# Patient Record
Sex: FEMALE | Race: WHITE | Employment: OTHER | URBAN - METROPOLITAN AREA
[De-identification: names, ages, dates, MRNs, and addresses within clinical notes are randomized per-mention and may not be internally consistent; named-entity substitution may affect disease eponyms.]

---

## 2017-01-10 ENCOUNTER — APPOINTMENT (OUTPATIENT)
Dept: LAB | Age: 82
End: 2017-01-10
Attending: INTERNAL MEDICINE
Payer: MEDICARE

## 2017-01-10 DIAGNOSIS — I10 ESSENTIAL HYPERTENSION, BENIGN: ICD-10-CM

## 2017-01-10 DIAGNOSIS — E78.5 HYPERLIPIDEMIA, UNSPECIFIED HYPERLIPIDEMIA TYPE: ICD-10-CM

## 2017-01-10 LAB
ALBUMIN SERPL BCP-MCNC: 3.5 G/DL (ref 3.5–4.8)
ALBUMIN/GLOB SERPL: 1.3 {RATIO} (ref 1–2)
ALP SERPL-CCNC: 68 U/L (ref 32–100)
ALT SERPL-CCNC: 25 U/L (ref 14–54)
ANION GAP SERPL CALC-SCNC: 10 MMOL/L (ref 0–18)
AST SERPL-CCNC: 28 U/L (ref 15–41)
BILIRUB SERPL-MCNC: 0.9 MG/DL (ref 0.3–1.2)
BUN SERPL-MCNC: 23 MG/DL (ref 8–20)
BUN/CREAT SERPL: 21.5 (ref 10–20)
CALCIUM SERPL-MCNC: 9.2 MG/DL (ref 8.5–10.5)
CHLORIDE SERPL-SCNC: 106 MMOL/L (ref 95–110)
CHOLEST SERPL-MCNC: 152 MG/DL (ref 110–200)
CO2 SERPL-SCNC: 23 MMOL/L (ref 22–32)
CREAT SERPL-MCNC: 1.07 MG/DL (ref 0.5–1.5)
GLOBULIN PLAS-MCNC: 2.7 G/DL (ref 2.5–3.7)
GLUCOSE SERPL-MCNC: 118 MG/DL (ref 70–99)
HDLC SERPL-MCNC: 57 MG/DL
LDLC SERPL CALC-MCNC: 77 MG/DL (ref 0–99)
NONHDLC SERPL-MCNC: 95 MG/DL
OSMOLALITY UR CALC.SUM OF ELEC: 293 MOSM/KG (ref 275–295)
POTASSIUM SERPL-SCNC: 4.1 MMOL/L (ref 3.3–5.1)
PROT SERPL-MCNC: 6.2 G/DL (ref 5.9–8.4)
SODIUM SERPL-SCNC: 139 MMOL/L (ref 136–144)
TRIGL SERPL-MCNC: 90 MG/DL (ref 1–149)

## 2017-01-10 PROCEDURE — 80061 LIPID PANEL: CPT

## 2017-01-10 PROCEDURE — 36415 COLL VENOUS BLD VENIPUNCTURE: CPT

## 2017-01-10 PROCEDURE — 80053 COMPREHEN METABOLIC PANEL: CPT

## 2017-01-31 RX ORDER — CLONIDINE HYDROCHLORIDE 0.1 MG/1
TABLET ORAL
Qty: 180 TABLET | Refills: 2 | Status: SHIPPED | OUTPATIENT
Start: 2017-01-31 | End: 2017-10-23

## 2017-01-31 RX ORDER — CARVEDILOL 25 MG/1
TABLET ORAL
Qty: 180 TABLET | Refills: 2 | Status: SHIPPED | OUTPATIENT
Start: 2017-01-31 | End: 2017-10-23

## 2017-01-31 RX ORDER — LOSARTAN POTASSIUM 100 MG/1
TABLET ORAL
Qty: 90 TABLET | Refills: 2 | Status: SHIPPED | OUTPATIENT
Start: 2017-01-31 | End: 2017-10-23

## 2017-05-12 ENCOUNTER — TELEPHONE (OUTPATIENT)
Dept: INTERNAL MEDICINE CLINIC | Facility: CLINIC | Age: 82
End: 2017-05-12

## 2017-05-12 NOTE — TELEPHONE ENCOUNTER
Pt states [facility] to get [facility]ill and she was told they need a verbal authorization, states she was told something is missing. Pt states [facility] has tried reaching office and faxed over request and have not heard back.   Pt sta

## 2017-05-13 RX ORDER — ALPRAZOLAM 0.25 MG/1
0.25 TABLET ORAL 2 TIMES DAILY PRN
Qty: 180 TABLET | Refills: 0 | Status: SHIPPED
Start: 2017-05-13 | End: 2017-10-23

## 2017-05-13 NOTE — TELEPHONE ENCOUNTER
Please advise on refill request    Last refilled on 12/19/16    Patient will need 90 day want to [facility]. Pended for 90 day. Will need to be faxed in. We cannot fax from the [facility].   I tried to call [facility] but stated is a medicar

## 2017-05-15 NOTE — TELEPHONE ENCOUNTER
Per mail in pharmacy received an rx med but would like to confirm that it comes from the office pls call [phone] Ref# [id number].

## 2017-05-30 ENCOUNTER — OFFICE VISIT (OUTPATIENT)
Dept: INTERNAL MEDICINE CLINIC | Facility: CLINIC | Age: 82
End: 2017-05-30

## 2017-05-30 VITALS — DIASTOLIC BLOOD PRESSURE: 71 MMHG | HEART RATE: 68 BPM | SYSTOLIC BLOOD PRESSURE: 135 MMHG

## 2017-05-30 DIAGNOSIS — I10 ESSENTIAL HYPERTENSION, BENIGN: ICD-10-CM

## 2017-05-30 DIAGNOSIS — E78.5 HYPERLIPIDEMIA, UNSPECIFIED HYPERLIPIDEMIA TYPE: ICD-10-CM

## 2017-05-30 DIAGNOSIS — Z00.00 ROUTINE GENERAL MEDICAL EXAMINATION AT A HEALTH CARE FACILITY: Primary | ICD-10-CM

## 2017-05-30 PROCEDURE — G0439 PPPS, SUBSEQ VISIT: HCPCS | Performed by: INTERNAL MEDICINE

## 2017-05-30 NOTE — PROGRESS NOTES
HPI:    Patient ID: [facility] is a 80year old female. Annual Preventative Exam  Catherine Larson arrives today for annual preventative physical examination. The patient complains of no new problems and has 0/10 pain.  Pt states taking medications as pres Cigarettes    Alcohol Use: Yes           0.5 oz/week       1 Glasses of wine per week       Comment: 1 glass of wine, weekly                 Current Outpatient Prescriptions:  ALPRAZolam 0.25 MG Oral Tab Take 1 tablet (0.25 mg total) by mouth 2 (two) times wheezes. She has no rales. Abdominal: Soft. Bowel sounds are normal. She exhibits no distension. There is no tenderness. There is no rebound. Musculoskeletal: Normal range of motion. She exhibits no edema (No lower extremity ).    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 Please check with your insurance carrier before scheduling to verify coverage.    PREVENTATIVE SERVICES  INDICATIONS AND SCHEDULE Internal Lab or Procedure External Lab or Proc previous visit. Update Immunization Activity if applicable    Tetanus No orders found for this or any previous visit. Update Immunization Activity if applicable    Zoster (Not covered by Medicare Part B) No orders found for this or any previous visit.  Rodrigo Quarles Functional Ability     Bathing or Showering: Able without help    Toileting: Able without help    Dressing: Able without help    Eating: Able without help    Driving: Cannot do without help    Preparing your meals: Able without help    Managing money/b No I have   trouble hearing conversations in a noisy background such as a crowded room   or restaurant:  No   I get confused about where sounds come from:  No I misunderstand some   words in a sentence and need to ask people to repeat themselves:  Yes   I reflects my personal performance and is accurate and complete.   Madi Magallon MD, 5/30/2017, 2:24 PM

## 2017-10-13 ENCOUNTER — PATIENT OUTREACH (OUTPATIENT)
Dept: INTERNAL MEDICINE CLINIC | Facility: CLINIC | Age: 82
End: 2017-10-13

## 2017-10-23 ENCOUNTER — OFFICE VISIT (OUTPATIENT)
Dept: INTERNAL MEDICINE CLINIC | Facility: CLINIC | Age: 82
End: 2017-10-23

## 2017-10-23 VITALS — DIASTOLIC BLOOD PRESSURE: 74 MMHG | SYSTOLIC BLOOD PRESSURE: 145 MMHG | HEART RATE: 64 BPM

## 2017-10-23 DIAGNOSIS — E78.5 HYPERLIPIDEMIA, UNSPECIFIED HYPERLIPIDEMIA TYPE: ICD-10-CM

## 2017-10-23 DIAGNOSIS — M67.432 GANGLION CYST OF WRIST, LEFT: ICD-10-CM

## 2017-10-23 DIAGNOSIS — I10 ESSENTIAL HYPERTENSION, BENIGN: Primary | ICD-10-CM

## 2017-10-23 PROCEDURE — G0463 HOSPITAL OUTPT CLINIC VISIT: HCPCS | Performed by: INTERNAL MEDICINE

## 2017-10-23 PROCEDURE — 99214 OFFICE O/P EST MOD 30 MIN: CPT | Performed by: INTERNAL MEDICINE

## 2017-10-23 RX ORDER — AMLODIPINE BESYLATE AND ATORVASTATIN CALCIUM 5; 20 MG/1; MG/1
1 TABLET, FILM COATED ORAL
Qty: 90 TABLET | Refills: 2 | Status: SHIPPED | OUTPATIENT
Start: 2017-10-23 | End: 2018-02-09

## 2017-10-23 RX ORDER — CLONIDINE HYDROCHLORIDE 0.1 MG/1
0.1 TABLET ORAL 2 TIMES DAILY
Qty: 180 TABLET | Refills: 2 | Status: SHIPPED | OUTPATIENT
Start: 2017-10-23 | End: 2018-09-11

## 2017-10-23 RX ORDER — LOSARTAN POTASSIUM 100 MG/1
100 TABLET ORAL
Qty: 90 TABLET | Refills: 2 | Status: SHIPPED | OUTPATIENT
Start: 2017-10-23 | End: 2019-12-02

## 2017-10-23 RX ORDER — HYDRALAZINE HYDROCHLORIDE 50 MG/1
TABLET, FILM COATED ORAL
Qty: 270 TABLET | Refills: 2 | Status: SHIPPED | OUTPATIENT
Start: 2017-10-23 | End: 2018-02-14

## 2017-10-23 RX ORDER — ALPRAZOLAM 0.25 MG/1
0.25 TABLET ORAL 2 TIMES DAILY PRN
Qty: 180 TABLET | Refills: 0 | Status: SHIPPED | OUTPATIENT
Start: 2017-10-23 | End: 2018-04-17

## 2017-10-23 RX ORDER — CARVEDILOL 25 MG/1
TABLET ORAL
Qty: 180 TABLET | Refills: 2 | Status: SHIPPED | OUTPATIENT
Start: 2017-10-23 | End: 2019-12-02

## 2017-10-23 NOTE — PROGRESS NOTES
HPI:    Patient ID: Radha Read is a 80year old female. Pt arrives today with a cane. Hyperlipidemia   This is a chronic problem. The current episode started more than 1 year ago. The problem is controlled.  Recent lipid tests were reviewed and are nor Psychiatric/Behavioral: Negative for behavioral problems. The patient is not nervous/anxious.         HISTORY:  Past Medical History:   Diagnosis Date   • Osteoarthrosis, unspecified whether generalized or localized, unspecified site    • Other and unspecif Cardiovascular: Normal rate, regular rhythm and normal heart sounds. Exam reveals no gallop and no friction rub. No murmur heard. Pulmonary/Chest: Effort normal and breath sounds normal. No respiratory distress. She has no wheezes. She has no rales.  Drea Russ -Pt c/o a cyst on her left anterolateral wrist. She states she has not seen an orthopedist for this problem.  It does not bother her.  -On exam, pt remarkable for ganglion cyst of anterior aspect of left wrist.    No orders of the defined types were placed

## 2017-10-24 ENCOUNTER — PATIENT OUTREACH (OUTPATIENT)
Dept: INTERNAL MEDICINE CLINIC | Facility: CLINIC | Age: 82
End: 2017-10-24

## 2017-10-24 NOTE — PROGRESS NOTES
Outreached to patient in regards to CCM program to explain program. Patient states is just walking out the door, offered to call back or send letter about the program. Requested letter. Will follow up in 2 weeks.

## 2017-10-30 RX ORDER — LOSARTAN POTASSIUM 100 MG/1
TABLET ORAL
Qty: 90 TABLET | Refills: 2 | Status: SHIPPED | OUTPATIENT
Start: 2017-10-30 | End: 2018-04-17

## 2017-10-30 RX ORDER — CARVEDILOL 25 MG/1
TABLET ORAL
Qty: 180 TABLET | Refills: 2 | Status: SHIPPED | OUTPATIENT
Start: 2017-10-30 | End: 2018-04-17

## 2017-10-30 RX ORDER — CLONIDINE HYDROCHLORIDE 0.1 MG/1
TABLET ORAL
Qty: 180 TABLET | Refills: 2 | Status: SHIPPED | OUTPATIENT
Start: 2017-10-30 | End: 2018-04-17

## 2017-11-08 ENCOUNTER — PATIENT OUTREACH (OUTPATIENT)
Dept: INTERNAL MEDICINE CLINIC | Facility: CLINIC | Age: 82
End: 2017-11-08

## 2017-11-08 NOTE — PROGRESS NOTES
Outreached to patient in follow up to CCM program. Patient received letter and is not interested in enrolling.  Informed to call with any questions or if would like to reconsider enrolling in the program.

## 2018-02-12 RX ORDER — AMLODIPINE BESYLATE AND ATORVASTATIN CALCIUM 5; 20 MG/1; MG/1
TABLET, FILM COATED ORAL
Qty: 90 TABLET | Refills: 3 | Status: SHIPPED | OUTPATIENT
Start: 2018-02-12 | End: 2018-09-11

## 2018-02-17 RX ORDER — HYDRALAZINE HYDROCHLORIDE 50 MG/1
TABLET, FILM COATED ORAL
Qty: 270 TABLET | Refills: 2 | Status: SHIPPED | OUTPATIENT
Start: 2018-02-17 | End: 2018-09-11

## 2018-04-17 ENCOUNTER — OFFICE VISIT (OUTPATIENT)
Dept: INTERNAL MEDICINE CLINIC | Facility: CLINIC | Age: 83
End: 2018-04-17

## 2018-04-17 VITALS — SYSTOLIC BLOOD PRESSURE: 164 MMHG | HEART RATE: 69 BPM | DIASTOLIC BLOOD PRESSURE: 73 MMHG

## 2018-04-17 DIAGNOSIS — N18.30 CKD (CHRONIC KIDNEY DISEASE) STAGE 3, GFR 30-59 ML/MIN (HCC): ICD-10-CM

## 2018-04-17 DIAGNOSIS — Z00.00 ROUTINE GENERAL MEDICAL EXAMINATION AT A HEALTH CARE FACILITY: Primary | ICD-10-CM

## 2018-04-17 DIAGNOSIS — R73.01 IFG (IMPAIRED FASTING GLUCOSE): ICD-10-CM

## 2018-04-17 PROCEDURE — G0439 PPPS, SUBSEQ VISIT: HCPCS | Performed by: INTERNAL MEDICINE

## 2018-04-17 RX ORDER — ALPRAZOLAM 0.25 MG/1
0.25 TABLET ORAL 2 TIMES DAILY PRN
Qty: 180 TABLET | Refills: 0 | Status: SHIPPED | OUTPATIENT
Start: 2018-04-17 | End: 2019-12-18

## 2018-04-17 NOTE — PROGRESS NOTES
HPI:    Patient ID: Odette Allan is a 80year old female. Heel Pain   This is a new problem. The current episode started more than 1 month ago. The problem occurs intermittently (at night). The problem has been waxing and waning.  Pertinent negatives in total) by mouth 2 (two) times daily. Disp: 180 tablet Rfl: 2   losartan 100 MG Oral Tab Take 1 tablet (100 mg total) by mouth once daily.  Disp: 90 tablet Rfl: 2   Calcium Carb-Cholecalciferol (CALCIUM CARBONATE-VITAMIN D3) 600-400 MG-UNIT Oral Tab Take by covered, or covered at this frequency, by your insurer. Please check with your insurance carrier before scheduling to verify coverage.    PREVENTATIVE SERVICES  INDICATIONS AND SCHEDULE Internal Lab or Procedure External Lab or Procedure   Diabetes Screenin Hepatitis B No orders found for this or any previous visit. Update Immunization Activity if applicable    Tetanus No orders found for this or any previous visit.  Update Immunization Activity if applicable    Zoster (Not covered by Medicare Part B) No or Tetanus, or Pneumococcal?: Yes     Functional Ability     Bathing or Showering: Able without help    Toileting: Able without help    Dressing: Able without help    Eating: Able without help    Driving: Cannot do without help    Preparing your meals: Able w or concerns today.    (R73.01) IFG (impaired fasting glucose)  Plan: 1/10/17: CMP: Glucose: 118  -CMP and HgA1C ordered for continued pt care.     (N18.3) CKD (chronic kidney disease) stage 3, GFR 30-59 ml/min  Plan: Stable.  CMP: 1/10/17: Creatinine: 1.07;

## 2018-04-18 ENCOUNTER — APPOINTMENT (OUTPATIENT)
Dept: LAB | Age: 83
End: 2018-04-18
Attending: INTERNAL MEDICINE
Payer: MEDICARE

## 2018-04-18 DIAGNOSIS — N18.30 CKD (CHRONIC KIDNEY DISEASE) STAGE 3, GFR 30-59 ML/MIN (HCC): ICD-10-CM

## 2018-04-18 DIAGNOSIS — R73.01 IFG (IMPAIRED FASTING GLUCOSE): ICD-10-CM

## 2018-04-18 PROCEDURE — 83036 HEMOGLOBIN GLYCOSYLATED A1C: CPT

## 2018-04-18 PROCEDURE — 80053 COMPREHEN METABOLIC PANEL: CPT

## 2018-04-18 PROCEDURE — 36415 COLL VENOUS BLD VENIPUNCTURE: CPT

## 2018-07-27 RX ORDER — LOSARTAN POTASSIUM 100 MG/1
TABLET ORAL
Qty: 90 TABLET | Refills: 2 | Status: SHIPPED | OUTPATIENT
Start: 2018-07-27 | End: 2018-09-11

## 2018-07-27 RX ORDER — CLONIDINE HYDROCHLORIDE 0.1 MG/1
TABLET ORAL
Qty: 180 TABLET | Refills: 2 | Status: SHIPPED | OUTPATIENT
Start: 2018-07-27 | End: 2018-09-11

## 2018-07-27 RX ORDER — CARVEDILOL 25 MG/1
TABLET ORAL
Qty: 180 TABLET | Refills: 2 | Status: SHIPPED | OUTPATIENT
Start: 2018-07-27 | End: 2018-09-11

## 2018-09-11 ENCOUNTER — OFFICE VISIT (OUTPATIENT)
Dept: INTERNAL MEDICINE CLINIC | Facility: CLINIC | Age: 83
End: 2018-09-11
Payer: MEDICARE

## 2018-09-11 VITALS — SYSTOLIC BLOOD PRESSURE: 160 MMHG | DIASTOLIC BLOOD PRESSURE: 66 MMHG | HEART RATE: 66 BPM

## 2018-09-11 DIAGNOSIS — N18.30 CKD (CHRONIC KIDNEY DISEASE) STAGE 3, GFR 30-59 ML/MIN (HCC): ICD-10-CM

## 2018-09-11 DIAGNOSIS — E78.5 HYPERLIPIDEMIA, UNSPECIFIED HYPERLIPIDEMIA TYPE: ICD-10-CM

## 2018-09-11 DIAGNOSIS — I10 ESSENTIAL HYPERTENSION, BENIGN: Primary | ICD-10-CM

## 2018-09-11 PROCEDURE — 99214 OFFICE O/P EST MOD 30 MIN: CPT | Performed by: INTERNAL MEDICINE

## 2018-09-11 PROCEDURE — G0463 HOSPITAL OUTPT CLINIC VISIT: HCPCS | Performed by: INTERNAL MEDICINE

## 2018-09-11 RX ORDER — LOSARTAN POTASSIUM 100 MG/1
100 TABLET ORAL
Qty: 90 TABLET | Refills: 3 | Status: SHIPPED | OUTPATIENT
Start: 2018-09-11 | End: 2020-01-09

## 2018-09-11 RX ORDER — AMLODIPINE BESYLATE AND ATORVASTATIN CALCIUM 5; 20 MG/1; MG/1
1 TABLET, FILM COATED ORAL
Qty: 90 TABLET | Refills: 3 | Status: SHIPPED | OUTPATIENT
Start: 2018-09-11 | End: 2020-01-09

## 2018-09-11 RX ORDER — CLONIDINE HYDROCHLORIDE 0.1 MG/1
0.1 TABLET ORAL 2 TIMES DAILY
Qty: 180 TABLET | Refills: 3 | Status: SHIPPED | OUTPATIENT
Start: 2018-09-11 | End: 2020-01-09

## 2018-09-11 RX ORDER — HYDRALAZINE HYDROCHLORIDE 50 MG/1
TABLET, FILM COATED ORAL
Qty: 270 TABLET | Refills: 3 | Status: SHIPPED | OUTPATIENT
Start: 2018-09-11 | End: 2020-01-09

## 2018-09-11 RX ORDER — CARVEDILOL 25 MG/1
TABLET ORAL
Qty: 180 TABLET | Refills: 2 | Status: SHIPPED | OUTPATIENT
Start: 2018-09-11 | End: 2019-07-02

## 2018-09-11 NOTE — PROGRESS NOTES
HPI:    Patient ID: Stephanie Vail is a 80year old female. Hyperlipidemia   This is a chronic problem. The current episode started more than 1 year ago. The problem is controlled. Lipid results: 1/10/17 Cholesterol 152, HDL 57, LDL 77, trig 90.  She has Tobacco Use      Smoking status: Former Smoker        Packs/day: 0.50        Types: Cigarettes    Alcohol use: Yes      Alcohol/week: 0.5 oz      Types: 1 Glasses of wine per week      Comment: 1 glass of wine, weekly     Drug use:  No              Current Effort normal and breath sounds normal. No respiratory distress. She has no wheezes. She has no rales. She exhibits no tenderness. Musculoskeletal: Normal range of motion. Neurological: She is alert and oriented to person, place, and time.    Skin: Skin hyperlipidemia type  1/10/17 Cholesterol 152, HDL 57, LDL 77, trig 90. Patient is treating with diet change, with significant improvement and was instructed to continue. No orders of the defined types were placed in this encounter.       Meds T

## 2019-02-11 ENCOUNTER — OFFICE VISIT (OUTPATIENT)
Dept: INTERNAL MEDICINE CLINIC | Facility: CLINIC | Age: 84
End: 2019-02-11
Payer: MEDICARE

## 2019-02-11 VITALS — SYSTOLIC BLOOD PRESSURE: 185 MMHG | HEART RATE: 70 BPM | DIASTOLIC BLOOD PRESSURE: 85 MMHG | TEMPERATURE: 97 F

## 2019-02-11 DIAGNOSIS — R35.0 URINARY FREQUENCY: Primary | ICD-10-CM

## 2019-02-11 LAB
APPEARANCE: CLEAR
MULTISTIX LOT#: NORMAL NUMERIC
PH, URINE: 7 (ref 4.5–8)
SPECIFIC GRAVITY: 1.02 (ref 1–1.03)
URINE-COLOR: YELLOW
UROBILINOGEN,SEMI-QN: 0.2 MG/DL (ref 0–1.9)

## 2019-02-11 PROCEDURE — 99213 OFFICE O/P EST LOW 20 MIN: CPT | Performed by: INTERNAL MEDICINE

## 2019-02-11 PROCEDURE — G0463 HOSPITAL OUTPT CLINIC VISIT: HCPCS | Performed by: INTERNAL MEDICINE

## 2019-02-11 PROCEDURE — 81003 URINALYSIS AUTO W/O SCOPE: CPT | Performed by: INTERNAL MEDICINE

## 2019-02-11 RX ORDER — AMOXICILLIN 500 MG/1
500 CAPSULE ORAL 3 TIMES DAILY
COMMUNITY
End: 2019-12-02 | Stop reason: ALTCHOICE

## 2019-02-11 NOTE — PROGRESS NOTES
HPI:    Patient ID: Bronwen Dandy is a 80year old female. Patient presents with:  Urinary Frequency    HPI  Urinary Frequency  Pt c/o urinary frequency. This is a new problem; Onset 02/10/2019 evening.  Pt denies any associated fever, chills, constipation generalized or localized, unspecified site    • Other and unspecified hyperlipidemia    • Sciatica 2009   • Unspecified essential hypertension       No past surgical history on file. No family history on file.    Social History    Tobacco Use      Smoking well-developed and well-nourished. HENT:   Head: Normocephalic and atraumatic. Right Ear: External ear normal.   Left Ear: External ear normal.   Eyes: Conjunctivae and EOM are normal.   Neck: Normal range of motion.    Cardiovascular: Normal rate, regu advised pt to continue drinking plenty of water. -F/U in 3 months. No orders of the defined types were placed in this encounter.       Meds This Visit:  Requested Prescriptions      No prescriptions requested or ordered in this encounter       Imaging &

## 2019-03-13 ENCOUNTER — TELEPHONE (OUTPATIENT)
Dept: INTERNAL MEDICINE CLINIC | Facility: CLINIC | Age: 84
End: 2019-03-13

## 2019-03-13 NOTE — TELEPHONE ENCOUNTER
Message # [phone]         2019 08:59p   [VANESSAL]  To:  From:  MAGDA Cline MD:  Phone#:  [phone]  Bibiana Bazzi [phone]  3/29/27 RE FLUID IN ELBOW AND PUFFY  Paged at number :  PA

## 2019-03-14 ENCOUNTER — OFFICE VISIT (OUTPATIENT)
Dept: INTERNAL MEDICINE CLINIC | Facility: CLINIC | Age: 84
End: 2019-03-14
Payer: MEDICARE

## 2019-03-14 VITALS — HEART RATE: 62 BPM | TEMPERATURE: 97 F | DIASTOLIC BLOOD PRESSURE: 77 MMHG | SYSTOLIC BLOOD PRESSURE: 164 MMHG

## 2019-03-14 DIAGNOSIS — M70.22 OLECRANON BURSITIS OF LEFT ELBOW: Primary | ICD-10-CM

## 2019-03-14 DIAGNOSIS — T14.8XXA HEMATOMA: ICD-10-CM

## 2019-03-14 PROCEDURE — 99213 OFFICE O/P EST LOW 20 MIN: CPT | Performed by: INTERNAL MEDICINE

## 2019-03-14 PROCEDURE — G0463 HOSPITAL OUTPT CLINIC VISIT: HCPCS | Performed by: INTERNAL MEDICINE

## 2019-03-14 NOTE — PROGRESS NOTES
HPI:    Patient ID: Khris Delcid is a 80year old female. Patient presents with:  Swelling: elbow swelling/ bruise left. HPI  Swelling  Patient c/o swelling/bruising of left elbow. This is a new problem; Onset 03/13/2019.  Patient denies any pain or a CloNIDine HCl 0.1 MG Oral Tab Take 1 tablet (0.1 mg total) by mouth 2 (two) times daily.  Disp: 180 tablet Rfl: 3   carvedilol 25 MG Oral Tab TAKE 1 TABLET TWICE A DAY WITH FOOD Disp: 180 tablet Rfl: 2   losartan 100 MG Oral Tab Take 1 tablet (100 mg tota is alert and oriented to person, place, and time. Skin: Skin is warm and dry. No rash noted. No erythema. Positive for swelling and ecchymosis of left elbow. Psychiatric: She has a normal mood and affect.  Her behavior is normal. Judgment and thought Lindsay Lopez MD.   Electronically Signed: Bird Grey, 3/14/2019, 10:20 AM.

## 2019-03-30 ENCOUNTER — TELEPHONE (OUTPATIENT)
Dept: INTERNAL MEDICINE CLINIC | Facility: CLINIC | Age: 84
End: 2019-03-30

## 2019-03-30 NOTE — TELEPHONE ENCOUNTER
Dr Caitlin Sahni, please advise. OV 3/14/19. Patient stated that her left elbow swelling is the same or maybe a little more. She said not really sore, just annoying. She is able to move left arm, use left hand.  She asked if she should see you today, or what to d

## 2019-04-01 ENCOUNTER — TELEPHONE (OUTPATIENT)
Dept: OTHER | Age: 84
End: 2019-04-01

## 2019-04-01 NOTE — TELEPHONE ENCOUNTER
Patient called again (see TE 4/1/19 condition update), stated that she called Ortho office and the earliest appointment that they can give to her will be this coming Monday 4/8/19, stated that her L elbow is getting worse, slightly  warm, swollen , no feve

## 2019-04-01 NOTE — TELEPHONE ENCOUNTER
Pt stated from [address] [place] [address] 3/14/19-  Left elbow still swollen asking which Ortho Dr she should see about draining it

## 2019-04-03 NOTE — TELEPHONE ENCOUNTER
Pt calling back. MD recommendations given. Pt states \"I am not going to IC, it really isn't that bad\"  Pt states she will keep 4/8/19 OV. Advised pt if symptoms worsen to go to IC and pt agrees to plan.

## 2019-04-08 ENCOUNTER — OFFICE VISIT (OUTPATIENT)
Dept: ORTHOPEDICS CLINIC | Facility: CLINIC | Age: 84
End: 2019-04-08
Payer: MEDICARE

## 2019-04-08 VITALS — SYSTOLIC BLOOD PRESSURE: 158 MMHG | HEART RATE: 65 BPM | RESPIRATION RATE: 16 BRPM | DIASTOLIC BLOOD PRESSURE: 79 MMHG

## 2019-04-08 DIAGNOSIS — M70.22 OLECRANON BURSITIS OF LEFT ELBOW: Primary | ICD-10-CM

## 2019-04-08 PROCEDURE — 99203 OFFICE O/P NEW LOW 30 MIN: CPT | Performed by: ORTHOPAEDIC SURGERY

## 2019-04-08 PROCEDURE — 20605 DRAIN/INJ JOINT/BURSA W/O US: CPT | Performed by: ORTHOPAEDIC SURGERY

## 2019-04-08 NOTE — H&P
NURSING INTAKE COMMENTS: Patient presents with:  Consult: left elbow swelling- pt states she states she first noticed 2 wks ago, no injury. pt denies any pain. HPI: This 80year old female presents today with complaints of left elbow swelling.   Start Rfl: 0     No current facility-administered medications on file prior to visit. Procaine                DIZZINESS  Sulfa Antibiotics           Comment:Other reaction(s): SULFA (SULFONAMIDE ANTIBIOTICS)    History reviewed.  No pertinent family history Asked        Hobby Hazards: Not Asked        Sleep Concern: Not Asked        Stress Concern: Not Asked        Weight Concern: Not Asked        Special Diet: Not Asked        Back Care: Not Asked        Exercise: Not Asked        Bike Helmet: Not Asked

## 2019-04-08 NOTE — PROCEDURES
Left elbow under sterile preparation and after the correct site was identified was aspirated and approximately 25 mL of straw-colored fluid was removed. The patient tolerated the procedure well. This was in the area of the olecranon bursa.

## 2019-04-09 ENCOUNTER — TELEPHONE (OUTPATIENT)
Dept: ORTHOPEDICS CLINIC | Facility: CLINIC | Age: 84
End: 2019-04-09

## 2019-04-09 NOTE — TELEPHONE ENCOUNTER
Pt states that she had elbow fluid drained yesterday and it has returned today. Please call, thank you.

## 2019-04-09 NOTE — TELEPHONE ENCOUNTER
She needs to come in for re aspiration tomorrow or Thursday. Nothing to do otherwise, can take Tylenol for the pain in the shoulder.

## 2019-04-09 NOTE — TELEPHONE ENCOUNTER
S/w pt and she states states swelling in the elbow returned today and she has a burning sensation in elbow. She denies any warmth, redness,fever/chills. Also now she is having pain in her shoulder. Please advise.

## 2019-04-10 ENCOUNTER — OFFICE VISIT (OUTPATIENT)
Dept: ORTHOPEDICS CLINIC | Facility: CLINIC | Age: 84
End: 2019-04-10
Payer: MEDICARE

## 2019-04-10 VITALS — SYSTOLIC BLOOD PRESSURE: 153 MMHG | RESPIRATION RATE: 16 BRPM | DIASTOLIC BLOOD PRESSURE: 72 MMHG | HEART RATE: 62 BPM

## 2019-04-10 DIAGNOSIS — M70.22 OLECRANON BURSITIS OF LEFT ELBOW: Primary | ICD-10-CM

## 2019-04-10 PROCEDURE — 20605 DRAIN/INJ JOINT/BURSA W/O US: CPT | Performed by: ORTHOPAEDIC SURGERY

## 2019-04-10 NOTE — PROCEDURES
Procedure: Under sterile preparation, the patient's left elbow olecranon bursa was aspirated and approximately 20 mL of blood-tinged fluid was removed. No signs of infection. Patient tolerated the procedure well.

## 2019-04-11 ENCOUNTER — TELEPHONE (OUTPATIENT)
Dept: ORTHOPEDICS CLINIC | Facility: CLINIC | Age: 84
End: 2019-04-11

## 2019-04-11 NOTE — TELEPHONE ENCOUNTER
She can loosen the ace wrap to something more comfortable. Keep wrapping elbow for the next couple of days, can stop Saturday.

## 2019-04-11 NOTE — TELEPHONE ENCOUNTER
Pt stets the wrap that  put on her arm yesterday is too tight - her circulation is cut off and arm is swelling - she is taking it off

## 2019-04-11 NOTE — TELEPHONE ENCOUNTER
Spoke to pt. Informed her of VT instructions. Pt states swelling has decreased since this AM after I spoke to her. Advised pt to continue elevate and ice for 15 minutes every so often and wear ACE. Discussed not putting it on too tight.  Advised pt to call

## 2019-07-02 RX ORDER — CARVEDILOL 25 MG/1
TABLET ORAL
Qty: 180 TABLET | Refills: 0 | Status: SHIPPED | OUTPATIENT
Start: 2019-07-02 | End: 2020-01-09

## 2019-09-30 NOTE — TELEPHONE ENCOUNTER
CSS, please call pt and assist with establishing care with a new PCP>    Requested Prescriptions     Pending Prescriptions Disp Refills   • carvedilol 25 MG Oral Tab [Pharmacy Med Name: CARVEDILOL (IR) TABS 25MG] 180 tablet 1     Sig: TAKE 1 TABLET TWICE A

## 2019-10-17 RX ORDER — CARVEDILOL 25 MG/1
TABLET ORAL
Qty: 180 TABLET | Refills: 1 | OUTPATIENT
Start: 2019-10-17

## 2019-10-19 NOTE — TELEPHONE ENCOUNTER
Per patient she cannot make an appointment yet since she has to coordinate a ride. She states she has enough medicine.  Thank you

## 2019-10-20 RX ORDER — LOSARTAN POTASSIUM 100 MG/1
100 TABLET ORAL
Qty: 90 TABLET | Refills: 3 | OUTPATIENT
Start: 2019-10-20

## 2019-10-20 RX ORDER — CLONIDINE HYDROCHLORIDE 0.1 MG/1
0.1 TABLET ORAL 2 TIMES DAILY
Qty: 180 TABLET | Refills: 3 | OUTPATIENT
Start: 2019-10-20

## 2019-10-26 RX ORDER — AMLODIPINE BESYLATE AND ATORVASTATIN CALCIUM 5; 20 MG/1; MG/1
1 TABLET, FILM COATED ORAL
Qty: 90 TABLET | Refills: 3 | OUTPATIENT
Start: 2019-10-26

## 2019-12-02 ENCOUNTER — OFFICE VISIT (OUTPATIENT)
Dept: INTERNAL MEDICINE CLINIC | Facility: CLINIC | Age: 84
End: 2019-12-02
Payer: MEDICARE

## 2019-12-02 ENCOUNTER — APPOINTMENT (OUTPATIENT)
Dept: LAB | Age: 84
End: 2019-12-02
Attending: INTERNAL MEDICINE
Payer: MEDICARE

## 2019-12-02 VITALS
TEMPERATURE: 98 F | HEART RATE: 67 BPM | HEIGHT: 60 IN | SYSTOLIC BLOOD PRESSURE: 142 MMHG | RESPIRATION RATE: 18 BRPM | WEIGHT: 152 LBS | BODY MASS INDEX: 29.84 KG/M2 | DIASTOLIC BLOOD PRESSURE: 72 MMHG

## 2019-12-02 DIAGNOSIS — N18.30 CKD (CHRONIC KIDNEY DISEASE) STAGE 3, GFR 30-59 ML/MIN (HCC): ICD-10-CM

## 2019-12-02 DIAGNOSIS — F41.9 ANXIETY: ICD-10-CM

## 2019-12-02 DIAGNOSIS — M15.9 PRIMARY OSTEOARTHRITIS INVOLVING MULTIPLE JOINTS: ICD-10-CM

## 2019-12-02 DIAGNOSIS — Z91.81 AT HIGH RISK FOR FALLS: ICD-10-CM

## 2019-12-02 DIAGNOSIS — I10 ESSENTIAL HYPERTENSION, BENIGN: Primary | ICD-10-CM

## 2019-12-02 DIAGNOSIS — I10 ESSENTIAL HYPERTENSION, BENIGN: ICD-10-CM

## 2019-12-02 DIAGNOSIS — R26.9 GAIT ABNORMALITY: ICD-10-CM

## 2019-12-02 PROCEDURE — 81015 MICROSCOPIC EXAM OF URINE: CPT

## 2019-12-02 PROCEDURE — 85027 COMPLETE CBC AUTOMATED: CPT

## 2019-12-02 PROCEDURE — 99214 OFFICE O/P EST MOD 30 MIN: CPT | Performed by: INTERNAL MEDICINE

## 2019-12-02 PROCEDURE — 36415 COLL VENOUS BLD VENIPUNCTURE: CPT

## 2019-12-02 PROCEDURE — 84439 ASSAY OF FREE THYROXINE: CPT

## 2019-12-02 PROCEDURE — G0463 HOSPITAL OUTPT CLINIC VISIT: HCPCS | Performed by: INTERNAL MEDICINE

## 2019-12-02 PROCEDURE — 84443 ASSAY THYROID STIM HORMONE: CPT

## 2019-12-02 PROCEDURE — 80053 COMPREHEN METABOLIC PANEL: CPT

## 2019-12-02 NOTE — PROGRESS NOTES
HPI:    Patient ID: Alexx [facility] is a 80year old female.     HPI    New pt to me  Prior PCP Dr Cole    80year old  With hx of HTN and osteoarthritis  Walks with a cane    Does not drive   Son drove her here today  She denies complaint orther than chronic 3   • losartan 100 MG Oral Tab Take 1 tablet (100 mg total) by mouth once daily. 90 tablet 3   • hydrALAzine HCl 50 MG Oral Tab TAKE 1 TABLET THREE TIMES A  tablet 3   • Amlodipine-Atorvastatin 5-20 MG Oral Tab Take 1 tablet by mouth once daily.  80 rate, regular rhythm, S1 normal, S2 normal, normal heart sounds and intact distal pulses. Exam reveals no gallop. No murmur heard. Pulmonary/Chest: Effort normal and breath sounds normal. No respiratory distress. She has no wheezes. She has no rales.  Sh

## 2019-12-06 ENCOUNTER — LAB ENCOUNTER (OUTPATIENT)
Dept: LAB | Age: 84
End: 2019-12-06
Attending: INTERNAL MEDICINE
Payer: MEDICARE

## 2019-12-06 DIAGNOSIS — R71.8 ELEVATED MCV: ICD-10-CM

## 2019-12-06 DIAGNOSIS — D69.6 THROMBOCYTOPENIA (HCC): ICD-10-CM

## 2019-12-06 PROCEDURE — 36415 COLL VENOUS BLD VENIPUNCTURE: CPT

## 2019-12-06 PROCEDURE — 85025 COMPLETE CBC W/AUTO DIFF WBC: CPT

## 2019-12-06 PROCEDURE — 82746 ASSAY OF FOLIC ACID SERUM: CPT

## 2019-12-06 PROCEDURE — 82607 VITAMIN B-12: CPT

## 2019-12-16 NOTE — TELEPHONE ENCOUNTER
Controlled medication pending for review. Please change to phone in, fax, or print script if not being sent electronically.     Last Rx: 04/17/19  LOV: 12/02/19    This is a mail order prescription

## 2019-12-16 NOTE — TELEPHONE ENCOUNTER
Patient is requesting a refill request on ALPRAZolam 0.25 MG Oral Tab to be sent through [facility] [employer] home delivery.

## 2019-12-18 RX ORDER — ALPRAZOLAM 0.25 MG/1
0.25 TABLET ORAL 2 TIMES DAILY PRN
Qty: 60 TABLET | Refills: 0 | OUTPATIENT
Start: 2019-12-18 | End: 2019-12-19

## 2019-12-19 RX ORDER — ALPRAZOLAM 0.25 MG/1
0.25 TABLET ORAL 2 TIMES DAILY PRN
Qty: 60 TABLET | Refills: 0 | Status: SHIPPED | OUTPATIENT
Start: 2019-12-19 | End: 2020-01-09

## 2019-12-19 NOTE — TELEPHONE ENCOUNTER
Rx for Alprazolam printed by [facility] [place] PA-C and faxed to [facility] (confirmation received) .

## 2019-12-19 NOTE — TELEPHONE ENCOUNTER
Rx for Alprazolam mail order needs to be faxed , unable to call in to mail order pharmacy . RX was approved by Dr Isaac Welch on 12/18/19 . Lisa Fischer PA-C can you please re-print Rx for Alprazolam  to be faxed . Pended .

## 2020-01-09 RX ORDER — CLONIDINE HYDROCHLORIDE 0.1 MG/1
0.1 TABLET ORAL 2 TIMES DAILY
Qty: 180 TABLET | Refills: 1 | Status: SHIPPED | OUTPATIENT
Start: 2020-01-09 | End: 2020-07-07

## 2020-01-09 RX ORDER — ALPRAZOLAM 0.25 MG/1
0.25 TABLET ORAL 2 TIMES DAILY PRN
Qty: 60 TABLET | Refills: 0 | OUTPATIENT
Start: 2020-01-09 | End: 2020-01-10

## 2020-01-09 RX ORDER — HYDRALAZINE HYDROCHLORIDE 50 MG/1
TABLET, FILM COATED ORAL
Qty: 270 TABLET | Refills: 1 | Status: SHIPPED | OUTPATIENT
Start: 2020-01-09 | End: 2020-07-07

## 2020-01-09 RX ORDER — CARVEDILOL 25 MG/1
TABLET ORAL
Qty: 180 TABLET | Refills: 1 | Status: SHIPPED | OUTPATIENT
Start: 2020-01-09 | End: 2020-07-07

## 2020-01-09 RX ORDER — AMLODIPINE BESYLATE AND ATORVASTATIN CALCIUM 5; 20 MG/1; MG/1
1 TABLET, FILM COATED ORAL
Qty: 90 TABLET | Refills: 1 | Status: SHIPPED | OUTPATIENT
Start: 2020-01-09 | End: 2020-07-07

## 2020-01-09 RX ORDER — LOSARTAN POTASSIUM 100 MG/1
100 TABLET ORAL
Qty: 90 TABLET | Refills: 1 | Status: SHIPPED | OUTPATIENT
Start: 2020-01-09 | End: 2020-07-07

## 2020-01-10 RX ORDER — ALPRAZOLAM 0.25 MG/1
0.25 TABLET ORAL 2 TIMES DAILY PRN
Qty: 60 TABLET | Refills: 0 | OUTPATIENT
Start: 2020-01-10 | End: 2020-01-14

## 2020-01-10 NOTE — TELEPHONE ENCOUNTER
[facility] Rx pended to be fax , please sign .

## 2020-01-14 RX ORDER — ALPRAZOLAM 0.25 MG/1
0.25 TABLET ORAL 2 TIMES DAILY PRN
Qty: 60 TABLET | Refills: 0 | Status: SHIPPED
Start: 2020-01-14 | End: 2020-01-15

## 2020-01-14 NOTE — TELEPHONE ENCOUNTER
Rx script for Alprazolam needs to fax, can't be phone in please print script. Will fax once script is signed.

## 2020-01-15 RX ORDER — ALPRAZOLAM 0.25 MG/1
0.25 TABLET ORAL 2 TIMES DAILY PRN
Qty: 60 TABLET | Refills: 0 | Status: SHIPPED
Start: 2020-01-15 | End: 2020-02-19

## 2020-02-19 RX ORDER — ALPRAZOLAM 0.25 MG/1
0.25 TABLET ORAL 2 TIMES DAILY PRN
Qty: 60 TABLET | Refills: 0 | OUTPATIENT
Start: 2020-02-19 | End: 2020-03-17

## 2020-02-24 ENCOUNTER — TELEPHONE (OUTPATIENT)
Dept: INTERNAL MEDICINE CLINIC | Facility: CLINIC | Age: 85
End: 2020-02-24

## 2020-02-24 NOTE — TELEPHONE ENCOUNTER
Chart reviewed, AVS from last office visit 12/2/19 printed Clonidine 0.1mg 1po BID on the med list twice, patient contacted and advised that this was a duplication, she should take clonidine, 1 tablet twice a day, patient agreed that is how she has been ta

## 2020-02-24 NOTE — TELEPHONE ENCOUNTER
Patient states she is looking at her medication list and it shows cloNIDine HCl 0.1 MG Oral Tab on there twice, she is wanting clarification.

## 2020-03-12 ENCOUNTER — TELEPHONE (OUTPATIENT)
Dept: INTERNAL MEDICINE CLINIC | Facility: CLINIC | Age: 85
End: 2020-03-12

## 2020-03-17 RX ORDER — ALPRAZOLAM 0.25 MG/1
0.25 TABLET ORAL 2 TIMES DAILY PRN
Qty: 60 TABLET | Refills: 0 | OUTPATIENT
Start: 2020-03-17 | End: 2020-03-18

## 2020-03-17 NOTE — TELEPHONE ENCOUNTER
Chart reviewed, all requested rx other than alprazolam were sent to [facility] on 1/9/20    Alprazolam of 2/19 is in phone in status and [facility] only accepts electronic and faxed rx for controlled substances

## 2020-03-17 NOTE — TELEPHONE ENCOUNTER
patient requesting for all 7 of her current medications to be [facility].       ALPRAZolam 0.25 MG Oral Tab  carvedilol 25 MG Oral Tab  cloNIDine HCl 0.1 MG Oral Tab  losartan 100 MG Oral Tab  hydrALAzine HCl 50 MG Oral Tab  amLODIPine-Atorvastatin 5-20 MG Oral Tab  Calcium Carbonate-Vitamin D3 600-400 MG-UNIT Oral Tab

## 2020-03-18 RX ORDER — ALPRAZOLAM 0.25 MG/1
0.25 TABLET ORAL 2 TIMES DAILY PRN
Qty: 60 TABLET | Refills: 0 | Status: CANCELLED | OUTPATIENT
Start: 2020-03-18

## 2020-03-19 NOTE — TELEPHONE ENCOUNTER
Dr Merari Roberson Alprazolam cannot be phoned in to  [facility] . Rx needs to be printed and faxed . Unable to pend medication see above , please print to be faxed .     The following medications are ordered in a future encounter, so you cannot change them:   ALPRAZolam 0.25 MG Oral Tab

## 2020-06-23 RX ORDER — ALPRAZOLAM 0.25 MG/1
0.25 TABLET ORAL 2 TIMES DAILY PRN
Qty: 60 TABLET | Refills: 0 | Status: CANCELLED
Start: 2020-06-23

## 2020-06-29 ENCOUNTER — TELEPHONE (OUTPATIENT)
Dept: INTERNAL MEDICINE CLINIC | Facility: CLINIC | Age: 85
End: 2020-06-29

## 2020-06-29 NOTE — TELEPHONE ENCOUNTER
Patient is requesting if  can give her a call to discuss her follow up to continue to get medication. Patient doesn't want to come in due to virus.      Patient also has medicare which doesn't cover virtual Telephone calls     Please advise

## 2020-06-30 NOTE — TELEPHONE ENCOUNTER
I met her once 6 months ago  80 with multiple medication including controlled substance Xanax  If she cannot come in should do tele or video visit for documentation  I dont feel comfortable prescribing without a follow up at least every 6 months  Which medicine did she want refilled?

## 2020-06-30 NOTE — TELEPHONE ENCOUNTER
Patient informed of message below. Will call back and schedule an appointment when her son gets home.

## 2020-07-07 RX ORDER — HYDRALAZINE HYDROCHLORIDE 50 MG/1
TABLET, FILM COATED ORAL
Qty: 270 TABLET | Refills: 3 | Status: SHIPPED | OUTPATIENT
Start: 2020-07-07 | End: 2021-07-05

## 2020-07-07 RX ORDER — CLONIDINE HYDROCHLORIDE 0.1 MG/1
TABLET ORAL
Qty: 180 TABLET | Refills: 3 | Status: SHIPPED | OUTPATIENT
Start: 2020-07-07 | End: 2021-07-05

## 2020-07-07 RX ORDER — LOSARTAN POTASSIUM 100 MG/1
TABLET ORAL
Qty: 90 TABLET | Refills: 3 | Status: SHIPPED | OUTPATIENT
Start: 2020-07-07 | End: 2021-07-05

## 2020-07-07 RX ORDER — AMLODIPINE BESYLATE AND ATORVASTATIN CALCIUM 5; 20 MG/1; MG/1
TABLET, FILM COATED ORAL
Qty: 90 TABLET | Refills: 3 | Status: SHIPPED | OUTPATIENT
Start: 2020-07-07 | End: 2021-07-05

## 2020-07-07 RX ORDER — CARVEDILOL 25 MG/1
TABLET ORAL
Qty: 180 TABLET | Refills: 3 | Status: SHIPPED | OUTPATIENT
Start: 2020-07-07 | End: 2021-07-05

## 2020-07-15 NOTE — TELEPHONE ENCOUNTER
Patient called back in stating she is currently unable to schedule a follow up- she does not want to come into due to her age and potential risk from covid, She cannot do phone visit because medicare will neither pay for it or allow her to be self pay. She cannot do a video visit because she does not have a phone that is capable or anyone who can help her. Please advise.

## 2020-07-17 ENCOUNTER — VIRTUAL PHONE E/M (OUTPATIENT)
Dept: INTERNAL MEDICINE CLINIC | Facility: CLINIC | Age: 85
End: 2020-07-17
Payer: MEDICARE

## 2020-07-17 DIAGNOSIS — N18.30 CKD (CHRONIC KIDNEY DISEASE) STAGE 3, GFR 30-59 ML/MIN (HCC): ICD-10-CM

## 2020-07-17 DIAGNOSIS — F41.9 ANXIETY: ICD-10-CM

## 2020-07-17 DIAGNOSIS — Z91.81 AT HIGH RISK FOR FALLS: ICD-10-CM

## 2020-07-17 DIAGNOSIS — R26.9 GAIT ABNORMALITY: ICD-10-CM

## 2020-07-17 DIAGNOSIS — I10 ESSENTIAL HYPERTENSION, BENIGN: Primary | ICD-10-CM

## 2020-07-17 DIAGNOSIS — E78.5 HYPERLIPIDEMIA, UNSPECIFIED HYPERLIPIDEMIA TYPE: ICD-10-CM

## 2020-07-17 PROCEDURE — 99443 PHONE E/M BY PHYS 21-30 MIN: CPT | Performed by: INTERNAL MEDICINE

## 2020-07-17 NOTE — PROGRESS NOTES
Virtual Telephone Check-In    Bronwen Dandy verbally consents to a Virtual/Telephone Check-In visit on 07/17/20. Patient has been referred to the [facility] website at [email or web address] to review the yearly Consent to Treat document.     Patient [place] tingles once in a while only when walking nothing that hurts     Hematological: Negative for adenopathy. Does not bruise/bleed easily. Psychiatric/Behavioral: Negative for agitation and behavioral problems.          Current Outpatient Medications   M kidney disease) stage 3, GFR 30-59 ml/min ([facility])  Plan: when able will come in for follow up within the next 3 months    (F41.9) Anxiety  Plan: stable on meds    (Z91.81) At high risk for falls  Plan: fall precauiton    (R26.9) Gait abnormality  Plan: fallp

## 2020-07-28 ENCOUNTER — TELEPHONE (OUTPATIENT)
Dept: INTERNAL MEDICINE CLINIC | Facility: CLINIC | Age: 85
End: 2020-07-28

## 2020-07-28 NOTE — TELEPHONE ENCOUNTER
Pt states Dr Susy Rea left her a message to call her back.     Best call back number is [phone]

## 2020-07-29 NOTE — TELEPHONE ENCOUNTER
Pt to schedule telephone visit  If agreeable   If not will see her in the office within the next 3 months

## 2020-07-30 PROBLEM — M70.22 OLECRANON BURSITIS OF LEFT ELBOW: Status: RESOLVED | Noted: 2019-04-08 | Resolved: 2020-07-30

## 2020-07-30 NOTE — TELEPHONE ENCOUNTER
2nd call - left message to patient voice mail and left message to [place] son of patient also will relay message below.

## 2020-08-17 ENCOUNTER — APPOINTMENT (OUTPATIENT)
Dept: LAB | Age: 85
End: 2020-08-17
Attending: INTERNAL MEDICINE
Payer: MEDICARE

## 2020-08-17 ENCOUNTER — OFFICE VISIT (OUTPATIENT)
Dept: INTERNAL MEDICINE CLINIC | Facility: CLINIC | Age: 85
End: 2020-08-17
Payer: MEDICARE

## 2020-08-17 VITALS
SYSTOLIC BLOOD PRESSURE: 176 MMHG | HEART RATE: 65 BPM | BODY MASS INDEX: 29.25 KG/M2 | HEIGHT: 60 IN | WEIGHT: 149 LBS | DIASTOLIC BLOOD PRESSURE: 79 MMHG | TEMPERATURE: 97 F

## 2020-08-17 DIAGNOSIS — Z00.00 ENCOUNTER FOR ANNUAL HEALTH EXAMINATION: ICD-10-CM

## 2020-08-17 DIAGNOSIS — R26.9 GAIT ABNORMALITY: ICD-10-CM

## 2020-08-17 DIAGNOSIS — E78.5 HYPERLIPIDEMIA, UNSPECIFIED HYPERLIPIDEMIA TYPE: ICD-10-CM

## 2020-08-17 DIAGNOSIS — I10 ESSENTIAL HYPERTENSION, BENIGN: Primary | ICD-10-CM

## 2020-08-17 DIAGNOSIS — N18.30 CKD (CHRONIC KIDNEY DISEASE) STAGE 3, GFR 30-59 ML/MIN (HCC): ICD-10-CM

## 2020-08-17 DIAGNOSIS — M15.9 PRIMARY OSTEOARTHRITIS INVOLVING MULTIPLE JOINTS: ICD-10-CM

## 2020-08-17 LAB
ALBUMIN SERPL-MCNC: 3.5 G/DL (ref 3.4–5)
ALBUMIN/GLOB SERPL: 0.9 {RATIO} (ref 1–2)
ALP LIVER SERPL-CCNC: 94 U/L (ref 55–142)
ALT SERPL-CCNC: 31 U/L (ref 13–56)
ANION GAP SERPL CALC-SCNC: 7 MMOL/L (ref 0–18)
AST SERPL-CCNC: 20 U/L (ref 15–37)
BILIRUB SERPL-MCNC: 0.6 MG/DL (ref 0.1–2)
BUN BLD-MCNC: 29 MG/DL (ref 7–18)
BUN/CREAT SERPL: 29 (ref 10–20)
CALCIUM BLD-MCNC: 9.3 MG/DL (ref 8.5–10.1)
CHLORIDE SERPL-SCNC: 111 MMOL/L (ref 98–112)
CO2 SERPL-SCNC: 25 MMOL/L (ref 21–32)
CREAT BLD-MCNC: 1 MG/DL (ref 0.55–1.02)
DEPRECATED RDW RBC AUTO: 48 FL (ref 35.1–46.3)
ERYTHROCYTE [DISTWIDTH] IN BLOOD BY AUTOMATED COUNT: 16.2 % (ref 11–15)
GLOBULIN PLAS-MCNC: 3.7 G/DL (ref 2.8–4.4)
GLUCOSE BLD-MCNC: 91 MG/DL (ref 70–99)
HCT VFR BLD AUTO: 33.5 % (ref 35–48)
HGB BLD-MCNC: 12.2 G/DL (ref 12–16)
M PROTEIN MFR SERPL ELPH: 7.2 G/DL (ref 6.4–8.2)
MCH RBC QN AUTO: 37.2 PG (ref 26–34)
MCHC RBC AUTO-ENTMCNC: 36.4 G/DL (ref 31–37)
MCV RBC AUTO: 102.1 FL (ref 80–100)
OSMOLALITY SERPL CALC.SUM OF ELEC: 301 MOSM/KG (ref 275–295)
PATIENT FASTING Y/N/NP: NO
PLATELET # BLD AUTO: 138 10(3)UL (ref 150–450)
POTASSIUM SERPL-SCNC: 4.4 MMOL/L (ref 3.5–5.1)
RBC # BLD AUTO: 3.28 X10(6)UL (ref 3.8–5.3)
SODIUM SERPL-SCNC: 143 MMOL/L (ref 136–145)
TSI SER-ACNC: 6.13 MIU/ML (ref 0.36–3.74)
WBC # BLD AUTO: 4.1 X10(3) UL (ref 4–11)

## 2020-08-17 PROCEDURE — G0439 PPPS, SUBSEQ VISIT: HCPCS | Performed by: INTERNAL MEDICINE

## 2020-08-17 PROCEDURE — 84443 ASSAY THYROID STIM HORMONE: CPT

## 2020-08-17 PROCEDURE — 80053 COMPREHEN METABOLIC PANEL: CPT

## 2020-08-17 PROCEDURE — 85027 COMPLETE CBC AUTOMATED: CPT

## 2020-08-17 PROCEDURE — 36415 COLL VENOUS BLD VENIPUNCTURE: CPT

## 2020-08-17 NOTE — PROGRESS NOTES
HPI:    Patient ID: Kunal Santos is a 80year old female.     HPI    BP (!) 176/79 (BP Location: Right arm, Patient Position: Sitting, Cuff Size: adult)   Pulse 65   Temp 96.7 °F (35.9 °C) (Oral)   Ht 5' (1.524 m)   Wt 149 lb (67.6 kg)   BMI 29.10 kg/m² week      Comment: 1 glass of wine, weekly     Drug use: No       PHYSICAL EXAM:    Physical Exam         ASSESSMENT/PLAN:   Essential hypertension, benign  (primary encounter diagnosis)  Ckd (chronic kidney disease) stage 3, gfr 30-59 ml/min ([facility])  Hyperl

## 2020-08-17 NOTE — PATIENT INSTRUCTIONS
Jadon Jurado's SCREENING SCHEDULE   Tests on this list are recommended by your physician but may not be covered, or covered at this frequency, by your insurer. Please check with your insurance carrier before scheduling to verify coverage.    PREVENTATIVE Covered up to Age 76     Colonoscopy Screen   Covered every 10 years- more often if abnormal There are no preventive care reminders to display for this patient.  Update Health Maintenance if applicable    Flex Sigmoidoscopy Screen  Covered every 5 years No orders found for this or any previous visit. Please get once after your 65th birthday    Pneumococcal 23 (Pneumovax)  Covered Once after 65 No orders found for this or any previous visit.  Please get once after your 65th birthday    Hepatitis B for Moderate

## 2020-08-17 NOTE — PROGRESS NOTES
HPI:   Anastacia King is a 80year old female who presents for a Medicare Subsequent Annual Wellness visit (Pt already had Initial Annual Wellness).       Her last annual assessment has been over 1 year: Annual Physical due on 04/17/2019         Fall/Risk As lb (67.6 kg)  12/02/19 : 152 lb (68.9 kg)  11/23/15 : 160 lb (72.6 kg)     Last Cholesterol Labs:   Lab Results   Component Value Date    CHOLEST 152 01/10/2017    HDL 57 01/10/2017    LDL 77 01/10/2017    TRIG 90 01/10/2017          Last Chemistry Labs: fatigue and fever. HENT: Negative for congestion, ear pain, hearing loss, sinus pressure and sore throat. Eyes: Negative for discharge and visual disturbance. Respiratory: Negative for cough, chest tightness, shortness of breath and wheezing.     Car women and children:  No I have trouble understanding the speaker in a large room such as at a meeting or place of Faith:  No   Many people I talk to seem to mumble (or don't speak clearly):  No People get annoyed because I misunderstand what they say:  N PRSV Free (30738) 10/09/2015   • FLUAD High Dose 65 yr and older (52621) 10/01/2019   • FLUZONE Quad Multidose 6-35 Mos (16645) 10/01/2012   • Influenza 10/04/2010, 09/13/2011, 10/01/2012   • Pneumococcal (Prevnar 13) 10/09/2015   • Zoster Vaccine Recombin Procedure External Lab or Procedure   Diabetes Screening      HbgA1C   Annually Glycohemoglobin (HgA1c) (%)   Date Value   04/18/2018 5.0    No flowsheet data found.     Fasting Blood Sugar (FSB)Annually Glucose (mg/dL)   Date Value   12/02/2019 105 (H) after your 65th birthday    Hepatitis B for Moderate/High Risk No vaccine history found Medium/high risk factors:   End-stage renal disease   Hemophiliacs who received Factor VIII or IX concentrates   Clients of institutions for the mentally retarded   Per

## 2021-02-01 ENCOUNTER — TELEPHONE (OUTPATIENT)
Dept: INTERNAL MEDICINE CLINIC | Facility: CLINIC | Age: 86
End: 2021-02-01

## 2021-03-12 DIAGNOSIS — Z23 NEED FOR VACCINATION: ICD-10-CM

## 2021-04-07 ENCOUNTER — LAB ENCOUNTER (OUTPATIENT)
Dept: LAB | Age: 86
End: 2021-04-07
Attending: INTERNAL MEDICINE
Payer: MEDICARE

## 2021-04-07 ENCOUNTER — OFFICE VISIT (OUTPATIENT)
Dept: INTERNAL MEDICINE CLINIC | Facility: CLINIC | Age: 86
End: 2021-04-07
Payer: MEDICARE

## 2021-04-07 VITALS
HEART RATE: 66 BPM | HEIGHT: 60 IN | SYSTOLIC BLOOD PRESSURE: 153 MMHG | BODY MASS INDEX: 29.84 KG/M2 | WEIGHT: 152 LBS | DIASTOLIC BLOOD PRESSURE: 72 MMHG

## 2021-04-07 DIAGNOSIS — Z00.00 MEDICARE ANNUAL WELLNESS VISIT, SUBSEQUENT: Primary | ICD-10-CM

## 2021-04-07 DIAGNOSIS — Z00.00 ENCOUNTER FOR ANNUAL HEALTH EXAMINATION: ICD-10-CM

## 2021-04-07 DIAGNOSIS — M15.9 PRIMARY OSTEOARTHRITIS INVOLVING MULTIPLE JOINTS: ICD-10-CM

## 2021-04-07 DIAGNOSIS — R26.9 GAIT ABNORMALITY: ICD-10-CM

## 2021-04-07 DIAGNOSIS — E78.5 HYPERLIPIDEMIA, UNSPECIFIED HYPERLIPIDEMIA TYPE: ICD-10-CM

## 2021-04-07 DIAGNOSIS — I10 ESSENTIAL HYPERTENSION, BENIGN: ICD-10-CM

## 2021-04-07 DIAGNOSIS — F41.9 ANXIETY: ICD-10-CM

## 2021-04-07 DIAGNOSIS — Z91.81 AT HIGH RISK FOR FALLS: ICD-10-CM

## 2021-04-07 DIAGNOSIS — N18.31 STAGE 3A CHRONIC KIDNEY DISEASE (HCC): ICD-10-CM

## 2021-04-07 PROCEDURE — 80053 COMPREHEN METABOLIC PANEL: CPT

## 2021-04-07 PROCEDURE — 84439 ASSAY OF FREE THYROXINE: CPT

## 2021-04-07 PROCEDURE — 84443 ASSAY THYROID STIM HORMONE: CPT

## 2021-04-07 PROCEDURE — 81015 MICROSCOPIC EXAM OF URINE: CPT

## 2021-04-07 PROCEDURE — 36415 COLL VENOUS BLD VENIPUNCTURE: CPT

## 2021-04-07 PROCEDURE — 99214 OFFICE O/P EST MOD 30 MIN: CPT | Performed by: INTERNAL MEDICINE

## 2021-04-07 PROCEDURE — 85027 COMPLETE CBC AUTOMATED: CPT

## 2021-04-07 NOTE — PROGRESS NOTES
HPI:   Ama Hernández is a 80year old female who presents for a Medicare Subsequent Annual Wellness visit (Pt already had Initial Annual Wellness).       Annual Physical due on 08/17/2021        Fall/Risk Assessment   She has been screened for Falls and is Hyperlipidemia     CKD (chronic kidney disease) stage 3, GFR 30-59 ml/min    Wt Readings from Last 3 Encounters:  04/07/21 : 152 lb (68.9 kg)  08/17/20 : 149 lb (67.6 kg)  12/02/19 : 152 lb (68.9 kg)     Last Cholesterol Labs:   Lab Results   Component Mihaela does not use drugs. REVIEW OF SYSTEMS:   Review of Systems   Constitutional: Negative for activity change, chills, fatigue and fever. HENT: Negative for ear discharge, nosebleeds, postnasal drip, rhinorrhea, sinus pressure and sore throat.     Eyes: N conversations: No   I have to worry about missing the telephone ring or doorbell: No I have trouble hearing conversations in a noisy background such as a crowded room or restaurant: No   I get confused about where sounds come from: No I misunderstand some Effort: Pulmonary effort is normal. No respiratory distress. Breath sounds: Normal breath sounds. No wheezing or rales. Chest:      Chest wall: No tenderness. Abdominal:      General: Bowel sounds are normal. There is no distension.       Palpation osteoarthritis involving multiple joints  Plan: pain controlled      (R26.9) Gait abnormality  Plan: fall precaution    (Z91.81) At high risk for falls  Plan: fall preucaiotn    (F41.9) Anxiety  Plan: very mild per pt doing well    \    Diet assessment: go flowsheet data found. Bone Density Screening      Dexascan Every two years No results found for this or any previous visit. No flowsheet data found.     Pap and Pelvic      Pap: Every 3 yrs age 21-65 or Pap+HPV every 5 yrs age 33-67, age 72 and older a Value   08/17/2020 4.4     POTASSIUM (P) (mmol/L)   Date Value   11/30/2015 3.9    No flowsheet data found. Creatinine  Annually Creatinine (mg/dL)   Date Value   08/17/2020 1.00    No flowsheet data found.     Drug Serum Conc  Annually No results found

## 2021-04-07 NOTE — PATIENT INSTRUCTIONS
Scooter Jurado's SCREENING SCHEDULE   Tests on this list are recommended by your physician but may not be covered, or covered at this frequency, by your insurer. Please check with your insurance carrier before scheduling to verify coverage.    PREVENTATIVE to Age 76     Colonoscopy Screen   Covered every 10 years- more often if abnormal There are no preventive care reminders to display for this patient.  Update Health Maintenance if applicable    Flex Sigmoidoscopy Screen  Covered every 5 years No results fou found for this or any previous visit. Please get once after your 65th birthday    Pneumococcal 23 (Pneumovax)  Covered Once after 65 No orders found for this or any previous visit.  Please get once after your 65th birthday    Hepatitis B for Moderate/High R

## 2021-04-12 ENCOUNTER — TELEPHONE (OUTPATIENT)
Dept: INTERNAL MEDICINE CLINIC | Facility: CLINIC | Age: 86
End: 2021-04-12

## 2021-04-12 RX ORDER — ALPRAZOLAM 0.25 MG/1
0.25 TABLET ORAL 2 TIMES DAILY PRN
Qty: 60 TABLET | Refills: 0 | Status: SHIPPED | OUTPATIENT
Start: 2021-04-12

## 2021-04-12 NOTE — TELEPHONE ENCOUNTER
Patient requests refill of her Alprazolam  Last prescribed by Dr. Ellis Ballesteros she only takes it when she is anxious    Patient last seen in office 04/07/21    Med pended

## 2021-07-05 RX ORDER — CLONIDINE HYDROCHLORIDE 0.1 MG/1
TABLET ORAL
Qty: 180 TABLET | Refills: 3 | Status: SHIPPED | OUTPATIENT
Start: 2021-07-05

## 2021-07-05 RX ORDER — LOSARTAN POTASSIUM 100 MG/1
TABLET ORAL
Qty: 90 TABLET | Refills: 3 | Status: SHIPPED | OUTPATIENT
Start: 2021-07-05

## 2021-07-05 RX ORDER — HYDRALAZINE HYDROCHLORIDE 50 MG/1
TABLET, FILM COATED ORAL
Qty: 270 TABLET | Refills: 3 | Status: SHIPPED | OUTPATIENT
Start: 2021-07-05

## 2021-07-05 RX ORDER — CARVEDILOL 25 MG/1
TABLET ORAL
Qty: 180 TABLET | Refills: 3 | Status: SHIPPED | OUTPATIENT
Start: 2021-07-05

## 2021-07-05 RX ORDER — AMLODIPINE BESYLATE AND ATORVASTATIN CALCIUM 5; 20 MG/1; MG/1
TABLET, FILM COATED ORAL
Qty: 90 TABLET | Refills: 3 | Status: SHIPPED | OUTPATIENT
Start: 2021-07-05

## 2021-07-12 ENCOUNTER — HOSPITAL ENCOUNTER (OUTPATIENT)
Age: 86
Discharge: HOME | End: 2021-07-12
Attending: EMERGENCY MEDICINE
Payer: MEDICARE

## 2021-07-12 ENCOUNTER — APPOINTMENT (OUTPATIENT)
Dept: GENERAL RADIOLOGY | Age: 86
End: 2021-07-12
Attending: EMERGENCY MEDICINE
Payer: MEDICARE

## 2021-07-12 ENCOUNTER — APPOINTMENT (OUTPATIENT)
Dept: ULTRASOUND IMAGING | Age: 86
End: 2021-07-12
Attending: EMERGENCY MEDICINE
Payer: MEDICARE

## 2021-07-12 VITALS
RESPIRATION RATE: 17 BRPM | DIASTOLIC BLOOD PRESSURE: 59 MMHG | HEART RATE: 75 BPM | TEMPERATURE: 97 F | OXYGEN SATURATION: 98 % | SYSTOLIC BLOOD PRESSURE: 130 MMHG

## 2021-07-12 DIAGNOSIS — M79.89 SWELLING OF BOTH LOWER EXTREMITIES: Primary | ICD-10-CM

## 2021-07-12 DIAGNOSIS — L03.119 CELLULITIS OF LOWER EXTREMITY, UNSPECIFIED LATERALITY: ICD-10-CM

## 2021-07-12 LAB
BASOPHILS # BLD AUTO: 0.06 X10(3) UL (ref 0–0.2)
BASOPHILS NFR BLD AUTO: 1.6 %
CREAT BLD-MCNC: 1 MG/DL
DEPRECATED RDW RBC AUTO: 45.1 FL (ref 35.1–46.3)
EOSINOPHIL # BLD AUTO: 0.17 X10(3) UL (ref 0–0.7)
EOSINOPHIL NFR BLD AUTO: 4.5 %
ERYTHROCYTE [DISTWIDTH] IN BLOOD BY AUTOMATED COUNT: 13.1 % (ref 11–15)
GLUCOSE BLD-MCNC: 106 MG/DL (ref 70–99)
HCT VFR BLD AUTO: 33.9 %
HCT VFR BLD AUTO: 35.2 %
HGB BLD-MCNC: 11 G/DL
HGB BLD-MCNC: 11.2 G/DL
IMM GRANULOCYTES # BLD AUTO: 0.02 X10(3) UL (ref 0–1)
IMM GRANULOCYTES NFR BLD: 0.5 %
ISTAT BUN: 23 MG/DL (ref 7–18)
ISTAT CHLORIDE: 109 MMOL/L (ref 98–112)
ISTAT HEMATOCRIT: 34 %
ISTAT IONIZED CALCIUM FOR CHEM 8: 1.22 MMOL/L (ref 1.12–1.32)
ISTAT POTASSIUM: 4.3 MMOL/L (ref 3.6–5.1)
ISTAT SODIUM: 143 MMOL/L (ref 136–145)
ISTAT TCO2: 22 MMOL/L (ref 21–32)
LYMPHOCYTES # BLD AUTO: 0.69 X10(3) UL (ref 1–4)
LYMPHOCYTES NFR BLD AUTO: 18.4 %
MCH RBC QN AUTO: 29.8 PG (ref 26–34)
MCH RBC QN AUTO: 30.3 PG (ref 26–34)
MCHC RBC AUTO-ENTMCNC: 31.8 G/DL (ref 31–37)
MCHC RBC AUTO-ENTMCNC: 32.4 G/DL (ref 31–37)
MCV RBC AUTO: 93.4 FL
MCV RBC AUTO: 93.6 FL (ref 80–100)
MONOCYTES # BLD AUTO: 0.43 X10(3) UL (ref 0.1–1)
MONOCYTES NFR BLD AUTO: 11.4 %
NEUTROPHILS # BLD AUTO: 2.39 X10 (3) UL (ref 1.5–7.7)
NEUTROPHILS # BLD AUTO: 2.39 X10(3) UL (ref 1.5–7.7)
NEUTROPHILS NFR BLD AUTO: 63.6 %
PLATELET # BLD AUTO: 153 10(3)UL (ref 150–450)
PLATELET # BLD AUTO: 300 X10ˆ3/UL (ref 150–450)
RBC # BLD AUTO: 3.63 X10(6)UL
RBC # BLD AUTO: 3.76 X10ˆ6/UL
WBC # BLD AUTO: 3.8 X10(3) UL (ref 4–11)
WBC # BLD AUTO: 3.8 X10ˆ3/UL (ref 4–11)

## 2021-07-12 PROCEDURE — 99214 OFFICE O/P EST MOD 30 MIN: CPT

## 2021-07-12 PROCEDURE — 85025 COMPLETE CBC W/AUTO DIFF WBC: CPT | Performed by: EMERGENCY MEDICINE

## 2021-07-12 PROCEDURE — 36415 COLL VENOUS BLD VENIPUNCTURE: CPT

## 2021-07-12 PROCEDURE — 93970 EXTREMITY STUDY: CPT | Performed by: EMERGENCY MEDICINE

## 2021-07-12 PROCEDURE — 73590 X-RAY EXAM OF LOWER LEG: CPT | Performed by: EMERGENCY MEDICINE

## 2021-07-12 PROCEDURE — 80047 BASIC METABLC PNL IONIZED CA: CPT

## 2021-07-12 PROCEDURE — 99204 OFFICE O/P NEW MOD 45 MIN: CPT

## 2021-07-12 RX ORDER — CEPHALEXIN 500 MG/1
500 CAPSULE ORAL 2 TIMES DAILY
Qty: 20 CAPSULE | Refills: 0 | Status: SHIPPED | OUTPATIENT
Start: 2021-07-12 | End: 2021-07-22

## 2021-07-12 NOTE — ED INITIAL ASSESSMENT (HPI)
Patient reports bilateral leg itchiness and swelling. Reports pain to right ankle, scabbing noted. Reports intermittent serous drainage from the legs. Denies injury/trauma. Provider at bedside.

## 2021-07-12 NOTE — ED PROVIDER NOTES
Patient Seen in: [facility]      History   Patient presents with:  Swelling Edema    Stated Complaint: leg pain    HPI/Subjective:   HPI    The patient is a 15-year-old female with past history of osteoarthritis, hypertension who presents now Well-developed well-nourished in no acute distress  Head: Normocephalic, no swelling or tenderness  Eyes: Nonicteric sclera, no conjunctival injection  ENT: TMs are clear and flat bilaterally.   There is no posterior pharyngeal erythema  Chest: Clear to [place] the lower extremity                             Disposition and Plan     Clinical Impression:  Swelling of both lower extremities  (primary encounter diagnosis)  Cellulitis of lower extremity, unspecified laterality     Disposition:  Discharge  7/12/2021

## 2021-07-13 ENCOUNTER — OFFICE VISIT (OUTPATIENT)
Dept: INTERNAL MEDICINE CLINIC | Facility: CLINIC | Age: 86
End: 2021-07-13
Payer: MEDICARE

## 2021-07-13 VITALS
HEART RATE: 88 BPM | WEIGHT: 150 LBS | HEIGHT: 60 IN | SYSTOLIC BLOOD PRESSURE: 184 MMHG | TEMPERATURE: 97 F | BODY MASS INDEX: 29.45 KG/M2 | DIASTOLIC BLOOD PRESSURE: 79 MMHG

## 2021-07-13 DIAGNOSIS — I87.2 VENOUS STASIS DERMATITIS OF BOTH LOWER EXTREMITIES: Primary | ICD-10-CM

## 2021-07-13 DIAGNOSIS — L03.90 CELLULITIS, UNSPECIFIED CELLULITIS SITE: ICD-10-CM

## 2021-07-13 DIAGNOSIS — R60.0 BILATERAL LOWER EXTREMITY EDEMA: ICD-10-CM

## 2021-07-13 DIAGNOSIS — I10 HYPERTENSION GOAL BP (BLOOD PRESSURE) < 130/80: ICD-10-CM

## 2021-07-13 PROCEDURE — 99214 OFFICE O/P EST MOD 30 MIN: CPT | Performed by: INTERNAL MEDICINE

## 2021-07-13 RX ORDER — FUROSEMIDE 20 MG/1
20 TABLET ORAL DAILY
Qty: 3 TABLET | Refills: 0 | Status: SHIPPED | OUTPATIENT
Start: 2021-07-13 | End: 2021-07-13

## 2021-07-13 RX ORDER — FUROSEMIDE 20 MG/1
20 TABLET ORAL DAILY
Qty: 3 TABLET | Refills: 0 | Status: SHIPPED | OUTPATIENT
Start: 2021-07-13 | End: 2021-07-16

## 2021-07-13 NOTE — PATIENT INSTRUCTIONS
1.  Please continue the cephalexin antibiotic that was prescribed yesterday at the urgent care  2.   I will give you 3 days worth of a diuretic called Lasix, take 1 tablet i.e. 20 mg daily and if you notice a significant improvement in your swelling then yo

## 2021-07-13 NOTE — PROGRESS NOTES
History of Present Illness   Patient ID: Amy Green is a 80year old female. Chief Complaint: ER F/U    80year-old female was seen 7/12/2021 at urgent care. Physician notes reviewed and appreciated.   She has noted to have bilateral lower extremity di ulceration, minimal erythema and warmth, there is 2+ pitting edema on the right, 1+ pitting edema on the left, scaling of skin. Neurological:      General: No focal deficit present.       Mental Status: She is alert and oriented to person, place, and time Cigarettes      Smokeless tobacco: Former User    Vaping Use      Vaping Use: Never used    Alcohol use:  Yes      Alcohol/week: 0.8 standard drinks      Types: 1 Glasses of wine per week      Comment: 1 glass of wine, weekly     Drug use: No     Reviewed C discuss. 3.  I would like for you to see the wound care center to help heal your wounds and reduce any infection, I will try to have someone come to the home to help as well.   4.  By the end of this week please let me know how you are doing, please call t

## 2021-07-19 ENCOUNTER — TELEPHONE (OUTPATIENT)
Dept: INTERNAL MEDICINE CLINIC | Facility: CLINIC | Age: 86
End: 2021-07-19

## 2021-07-19 NOTE — TELEPHONE ENCOUNTER
Patient was seen on 07/13/21 by Dr. Mohamud Sandhu. Per office visit notes: Follow Up:    Return let me know how you feel in 3-4 days, call the office. .    She states that she is \"doing okay. \" She denies any pain or discomfort.  She states that legs are still

## 2021-07-19 NOTE — TELEPHONE ENCOUNTER
Spoke with Pt and informed her of Dr. Jimmie Velazquez message. Pt states she does not recognize the name of the medicine and did not take anything for the leg swelling. Pt states swelling is still there. Right leg more swollen than left.   Pt requested to call [facility]

## 2021-07-28 ENCOUNTER — TELEPHONE (OUTPATIENT)
Dept: INTERNAL MEDICINE CLINIC | Facility: CLINIC | Age: 86
End: 2021-07-28

## 2021-07-28 NOTE — TELEPHONE ENCOUNTER
Patient just wanted to inform Dr. Padma Foster that she went to see Dr. Nino Rapp for some swelling and itchiness on her left foot and received medication. Does not need a call back unless theres any questions.

## 2021-07-30 DIAGNOSIS — R60.0 BILATERAL LOWER EXTREMITY EDEMA: ICD-10-CM

## 2021-07-30 RX ORDER — FUROSEMIDE 20 MG/1
TABLET ORAL
Qty: 3 TABLET | Refills: 0 | OUTPATIENT
Start: 2021-07-30

## 2021-09-11 ENCOUNTER — TELEPHONE (OUTPATIENT)
Dept: INTERNAL MEDICINE CLINIC | Facility: CLINIC | Age: 86
End: 2021-09-11

## 2021-09-11 ENCOUNTER — MOBILE ENCOUNTER (OUTPATIENT)
Dept: INTERNAL MEDICINE CLINIC | Facility: CLINIC | Age: 86
End: 2021-09-11

## 2021-09-11 RX ORDER — CEPHALEXIN 500 MG/1
500 CAPSULE ORAL 4 TIMES DAILY
Qty: 40 CAPSULE | Refills: 0 | Status: SHIPPED | OUTPATIENT
Start: 2021-09-11 | End: 2021-09-21

## 2021-09-12 ENCOUNTER — TELEPHONE (OUTPATIENT)
Dept: INTERNAL MEDICINE CLINIC | Facility: CLINIC | Age: 86
End: 2021-09-12

## 2021-09-12 NOTE — TELEPHONE ENCOUNTER
Patient called stating that her symptoms from the cellulitis never completely resolved, she no longer has antibiotics. No fever no chills, no red streaks . Keflex was refilled and patient was notified that she should contact Dr Abida Singh to be evaluated.

## 2021-09-13 NOTE — TELEPHONE ENCOUNTER
Message # [phone]         2021 05:17p   [[id number]]  To:  From:  MAGDA Cline MD:  Phone#:  ----------------------------------------------------------------------  Cristobal Lopez [phone] ,  27, PT HAS A  PROBLEM WITH HER LEGS AND NEED A CALL

## 2021-09-23 ENCOUNTER — TELEPHONE (OUTPATIENT)
Dept: INTERNAL MEDICINE CLINIC | Facility: CLINIC | Age: 86
End: 2021-09-23

## 2021-09-23 NOTE — TELEPHONE ENCOUNTER
Patient states, \"The medication that Dr. Ramez Dorsey gave me for my legs did not help\". Patient states she was prescribed cephalexin on 9/11/21 and there was no change in her legs. Patient had contacted on-call Dr. Michael Larson.  She was also seen by Dr. Gemini Lema on

## 2021-09-24 ENCOUNTER — TELEPHONE (OUTPATIENT)
Dept: FAMILY MEDICINE CLINIC | Facility: CLINIC | Age: 86
End: 2021-09-24

## 2021-09-24 NOTE — TELEPHONE ENCOUNTER
Patient returned call. States she completed antibiotics that were prescribed over the phone for her leg and now its worse. Wound to right anterior lower leg. Skin is \"peeling\". Skin has a scab to affected area where there is a small sore.    Patient inf

## 2021-09-24 NOTE — TELEPHONE ENCOUNTER
Spoke with patient (name and  verified), informed of message below. She will discuss with her son Christine Salas.

## 2021-09-24 NOTE — TELEPHONE ENCOUNTER
Skin peeling. No fever, no chills, no red streaks. +itchy at times. Denies drainage, swelling and SOB. Patient states that she was speaking to another RN regarding an appointment. Call transferred to Yin Felder, as she was also triaging patient.     Keith Sandoval

## 2021-09-24 NOTE — TELEPHONE ENCOUNTER
Pt states she needs antibiotic for right leg swelling.   States she finished her medication and her skin is beginning to peel

## 2021-09-24 NOTE — TELEPHONE ENCOUNTER
Patient needs to be seen for evaluation, I am not comfortable prescribing medication to patient who is getting worse, she needs to see a physician so the right decision can be made sometimes people need to be treated at the hospital with intravenous antibi

## 2021-09-27 ENCOUNTER — OFFICE VISIT (OUTPATIENT)
Dept: FAMILY MEDICINE CLINIC | Facility: CLINIC | Age: 86
End: 2021-09-27
Payer: MEDICARE

## 2021-09-27 VITALS
DIASTOLIC BLOOD PRESSURE: 54 MMHG | SYSTOLIC BLOOD PRESSURE: 122 MMHG | WEIGHT: 138 LBS | OXYGEN SATURATION: 97 % | HEART RATE: 65 BPM | RESPIRATION RATE: 16 BRPM | BODY MASS INDEX: 27.09 KG/M2 | HEIGHT: 60 IN

## 2021-09-27 DIAGNOSIS — L98.9 SKIN LESION: Primary | ICD-10-CM

## 2021-09-27 PROCEDURE — 99213 OFFICE O/P EST LOW 20 MIN: CPT | Performed by: PHYSICIAN ASSISTANT

## 2021-09-27 NOTE — PROGRESS NOTES
HPI:    Patient ID: [facility] is a 80year old female. Patient presents for wound check. She was given keflex and completed the course. She has no fever/chills. She does have a lesion on her lower right leg. No draining. No pain.  She is otherwise do rales.   Skin:     General: Skin is warm and dry. Findings: Lesion (3 cm lesion noted on the right lower leg. no draining. no tenderness noted. Slight erythema noted) present.       Comments: Mid calf measurement for swelling was 13.25 in for both legs

## 2022-02-28 ENCOUNTER — TELEPHONE (OUTPATIENT)
Dept: INTERNAL MEDICINE CLINIC | Facility: CLINIC | Age: 87
End: 2022-02-28

## 2022-05-09 ENCOUNTER — OFFICE VISIT (OUTPATIENT)
Dept: PODIATRY CLINIC | Facility: CLINIC | Age: 87
End: 2022-05-09
Payer: MEDICARE

## 2022-05-09 DIAGNOSIS — B35.1 ONYCHOMYCOSIS: Primary | ICD-10-CM

## 2022-05-09 DIAGNOSIS — M79.671 BILATERAL FOOT PAIN: ICD-10-CM

## 2022-05-09 DIAGNOSIS — M20.42 HAMMERTOES OF BOTH FEET: ICD-10-CM

## 2022-05-09 DIAGNOSIS — M79.672 BILATERAL FOOT PAIN: ICD-10-CM

## 2022-05-09 DIAGNOSIS — L84 FOOT CALLUS: ICD-10-CM

## 2022-05-09 DIAGNOSIS — R26.81 GAIT INSTABILITY: ICD-10-CM

## 2022-05-09 DIAGNOSIS — M20.41 HAMMERTOES OF BOTH FEET: ICD-10-CM

## 2022-05-18 ENCOUNTER — LAB ENCOUNTER (OUTPATIENT)
Dept: LAB | Age: 87
End: 2022-05-18
Attending: INTERNAL MEDICINE
Payer: MEDICARE

## 2022-05-18 ENCOUNTER — NURSE TRIAGE (OUTPATIENT)
Dept: INTERNAL MEDICINE CLINIC | Facility: CLINIC | Age: 87
End: 2022-05-18

## 2022-05-18 ENCOUNTER — OFFICE VISIT (OUTPATIENT)
Dept: INTERNAL MEDICINE CLINIC | Facility: CLINIC | Age: 87
End: 2022-05-18
Payer: MEDICARE

## 2022-05-18 VITALS
HEART RATE: 68 BPM | HEIGHT: 60 IN | WEIGHT: 150 LBS | SYSTOLIC BLOOD PRESSURE: 152 MMHG | BODY MASS INDEX: 29.45 KG/M2 | DIASTOLIC BLOOD PRESSURE: 67 MMHG

## 2022-05-18 DIAGNOSIS — M79.89 LEG SWELLING: Primary | ICD-10-CM

## 2022-05-18 DIAGNOSIS — E78.2 MIXED HYPERLIPIDEMIA: ICD-10-CM

## 2022-05-18 DIAGNOSIS — I10 PRIMARY HYPERTENSION: ICD-10-CM

## 2022-05-18 DIAGNOSIS — M79.89 LEG SWELLING: ICD-10-CM

## 2022-05-18 LAB
ANION GAP SERPL CALC-SCNC: 7 MMOL/L (ref 0–18)
BUN BLD-MCNC: 23 MG/DL (ref 7–18)
BUN/CREAT SERPL: 24.5 (ref 10–20)
CALCIUM BLD-MCNC: 9 MG/DL (ref 8.5–10.1)
CHLORIDE SERPL-SCNC: 110 MMOL/L (ref 98–112)
CO2 SERPL-SCNC: 24 MMOL/L (ref 21–32)
CREAT BLD-MCNC: 0.94 MG/DL
FASTING STATUS PATIENT QL REPORTED: NO
GLUCOSE BLD-MCNC: 133 MG/DL (ref 70–99)
OSMOLALITY SERPL CALC.SUM OF ELEC: 298 MOSM/KG (ref 275–295)
POTASSIUM SERPL-SCNC: 4.1 MMOL/L (ref 3.5–5.1)
SODIUM SERPL-SCNC: 141 MMOL/L (ref 136–145)

## 2022-05-18 PROCEDURE — 80048 BASIC METABOLIC PNL TOTAL CA: CPT

## 2022-05-18 PROCEDURE — 99214 OFFICE O/P EST MOD 30 MIN: CPT | Performed by: INTERNAL MEDICINE

## 2022-05-18 PROCEDURE — 36415 COLL VENOUS BLD VENIPUNCTURE: CPT

## 2022-05-18 RX ORDER — HYDROCHLOROTHIAZIDE 12.5 MG/1
12.5 TABLET ORAL DAILY
Qty: 90 TABLET | Refills: 3 | Status: SHIPPED | OUTPATIENT
Start: 2022-05-18

## 2022-05-18 RX ORDER — HYDROCHLOROTHIAZIDE 12.5 MG/1
12.5 CAPSULE, GELATIN COATED ORAL DAILY
Qty: 30 CAPSULE | Refills: 0 | Status: SHIPPED | OUTPATIENT
Start: 2022-05-18

## 2022-05-18 RX ORDER — ATORVASTATIN CALCIUM 40 MG/1
40 TABLET, FILM COATED ORAL NIGHTLY
Qty: 30 TABLET | Refills: 0 | Status: SHIPPED | OUTPATIENT
Start: 2022-05-18

## 2022-05-18 RX ORDER — ATORVASTATIN CALCIUM 40 MG/1
40 TABLET, FILM COATED ORAL NIGHTLY
Qty: 90 TABLET | Refills: 3 | Status: SHIPPED | OUTPATIENT
Start: 2022-05-18

## 2022-05-19 ENCOUNTER — TELEPHONE (OUTPATIENT)
Dept: INTERNAL MEDICINE CLINIC | Facility: CLINIC | Age: 87
End: 2022-05-19

## 2022-05-19 NOTE — TELEPHONE ENCOUNTER
Pt's son Ortega Jung) ok tiny, came into office upset demanding to speak with doctor and discuss pts appt yesterday as he was not present at appt yesterday and pt's other son Dewain Burkitt) was.

## 2022-05-19 NOTE — TELEPHONE ENCOUNTER
Will try to call at time permits but unsure of when. SHAYNAWOLFGANGJOSEFINA should discuss this with his brother, Alexander Power. Neither Alexander Power nor patient told me nor staff that DICKSON wanted to be present, I have given instructions in the AVS explaning everything we went through, changed, and why.

## 2022-05-23 ENCOUNTER — TELEPHONE (OUTPATIENT)
Dept: INTERNAL MEDICINE CLINIC | Facility: CLINIC | Age: 87
End: 2022-05-23

## 2022-05-23 NOTE — TELEPHONE ENCOUNTER
Patient calling to inquire which medication she was to discontinue. Was advised to that the amlodipine/atorvastatin combo was discontinued (see 5/18 office visit note). Verbalizes understanding and agrees with plan. Will call back to reschedule office visit for follow up.

## 2022-05-28 ENCOUNTER — TELEPHONE (OUTPATIENT)
Dept: INTERNAL MEDICINE CLINIC | Facility: CLINIC | Age: 87
End: 2022-05-28

## 2022-05-28 NOTE — TELEPHONE ENCOUNTER
Patient called office. Patient's date of birth and full name both confirmed. Asking who here primary doctor is. Per Chart: Dr. Robina Barahona, upcoming physical.   Provided date and time so patient can arrange transportation. Patient agrees to call office if appointment time/date needs to be changed.          Future Appointments   Date Time Provider Dante Acevedo   6/8/2022  3:40 PM MD MAGNUS Garcia   8/10/2022 10:00 AM Meshulam, Dore Augusta, DPM [facility] [place]

## 2022-06-06 ENCOUNTER — TELEPHONE (OUTPATIENT)
Dept: INTERNAL MEDICINE CLINIC | Facility: CLINIC | Age: 87
End: 2022-06-06

## 2022-06-08 ENCOUNTER — OFFICE VISIT (OUTPATIENT)
Dept: INTERNAL MEDICINE CLINIC | Facility: CLINIC | Age: 87
End: 2022-06-08
Payer: MEDICARE

## 2022-06-08 VITALS
WEIGHT: 140.19 LBS | BODY MASS INDEX: 27.52 KG/M2 | DIASTOLIC BLOOD PRESSURE: 69 MMHG | HEART RATE: 66 BPM | SYSTOLIC BLOOD PRESSURE: 142 MMHG | HEIGHT: 60 IN

## 2022-06-08 DIAGNOSIS — Z91.81 AT RISK FOR FALLING: ICD-10-CM

## 2022-06-08 DIAGNOSIS — E78.5 HYPERLIPIDEMIA, UNSPECIFIED HYPERLIPIDEMIA TYPE: ICD-10-CM

## 2022-06-08 DIAGNOSIS — Z00.00 ENCOUNTER FOR ANNUAL HEALTH EXAMINATION: ICD-10-CM

## 2022-06-08 DIAGNOSIS — Z00.00 MEDICARE ANNUAL WELLNESS VISIT, SUBSEQUENT: Primary | ICD-10-CM

## 2022-06-08 DIAGNOSIS — M15.9 PRIMARY OSTEOARTHRITIS INVOLVING MULTIPLE JOINTS: ICD-10-CM

## 2022-06-08 DIAGNOSIS — N18.31 STAGE 3A CHRONIC KIDNEY DISEASE (HCC): ICD-10-CM

## 2022-06-08 DIAGNOSIS — I87.2 VENOUS STASIS DERMATITIS OF BOTH LOWER EXTREMITIES: ICD-10-CM

## 2022-06-08 DIAGNOSIS — I10 ESSENTIAL HYPERTENSION, BENIGN: ICD-10-CM

## 2022-06-08 DIAGNOSIS — R26.9 GAIT ABNORMALITY: ICD-10-CM

## 2022-06-08 PROCEDURE — 1126F AMNT PAIN NOTED NONE PRSNT: CPT | Performed by: INTERNAL MEDICINE

## 2022-06-08 PROCEDURE — G0439 PPPS, SUBSEQ VISIT: HCPCS | Performed by: INTERNAL MEDICINE

## 2022-06-08 NOTE — TELEPHONE ENCOUNTER
Late entry  I spoke with pt son  He would like pt evaluated  her overall physical and mental capacity  I have not see her for more than a year  He would like to establish power of  over her health care and ?  Finances  He is already the executor of her will  I discussed if patient is able to make her own decision she will have to decide if he can be power of  for patient  If there is a question regarding her capacity to make decision for herself due to cognitive dysfunction the she will need to be evaluated  By neurology and psychiatry to determine is she is no longer capable of making decision for herself  Per son Pt is scheduled to see me tomorrow and will further discuss

## 2022-06-27 PROBLEM — D69.6 THROMBOCYTOPENIA: Status: ACTIVE | Noted: 2022-01-01

## 2022-06-27 PROBLEM — D69.6 THROMBOCYTOPENIA: Status: RESOLVED | Noted: 2022-01-01 | Resolved: 2022-01-01

## 2022-06-27 PROBLEM — D69.6 THROMBOCYTOPENIA: Status: RESOLVED | Noted: 2022-06-27 | Resolved: 2022-06-27

## 2022-06-27 PROBLEM — D69.6 THROMBOCYTOPENIA (HCC): Status: RESOLVED | Noted: 2022-06-27 | Resolved: 2022-06-27

## 2022-06-27 PROBLEM — D69.6 THROMBOCYTOPENIA (HCC): Status: ACTIVE | Noted: 2022-06-27

## 2022-06-27 PROBLEM — D69.6 THROMBOCYTOPENIA: Status: ACTIVE | Noted: 2022-06-27

## 2022-06-27 RX ORDER — CARVEDILOL 25 MG/1
TABLET ORAL
Qty: 180 TABLET | Refills: 3 | Status: SHIPPED | OUTPATIENT
Start: 2022-06-27

## 2022-06-27 RX ORDER — HYDRALAZINE HYDROCHLORIDE 50 MG/1
TABLET, FILM COATED ORAL
Qty: 270 TABLET | Refills: 3 | Status: SHIPPED | OUTPATIENT
Start: 2022-06-27

## 2022-06-27 RX ORDER — CLONIDINE HYDROCHLORIDE 0.1 MG/1
TABLET ORAL
Qty: 180 TABLET | Refills: 3 | Status: SHIPPED | OUTPATIENT
Start: 2022-06-27

## 2022-06-27 RX ORDER — LOSARTAN POTASSIUM 100 MG/1
TABLET ORAL
Qty: 90 TABLET | Refills: 3 | Status: SHIPPED | OUTPATIENT
Start: 2022-06-27

## 2022-06-27 NOTE — TELEPHONE ENCOUNTER
Refill passed per [facility] protocol.      Requested Prescriptions   Pending Prescriptions Disp Refills    HYDRALAZINE 50 MG Oral Tab [Pharmacy Med Name: HYDRALAZINE TABS 50MG] 270 tablet 3     Sig: TAKE 1 TABLET THREE TIMES A DAY        Hypertensive Medications Protocol Passed - 6/26/2022 11:43 PM        Passed - CMP or BMP in past 12 months        Passed - Appointment in past 6 or next 3 months        Passed - GFR Non- > 50     Lab Results   Component Value Date    GFRNAA 52 (L) 05/18/2022                    LOSARTAN 100 MG Oral Tab [Pharmacy Med Name: Chip Boys 100MG] 90 tablet 3     Sig: TAKE 1 TABLET DAILY        Hypertensive Medications Protocol Passed - 6/26/2022 11:43 PM        Passed - CMP or BMP in past 12 months        Passed - Appointment in past 6 or next 3 months        Passed - GFR Non- > 50     Lab Results   Component Value Date    GFRNAA 52 (L) 05/18/2022                    CARVEDILOL 25 MG Oral Tab [Pharmacy Med Name: CARVEDILOL (IR) TABS 25MG] 180 tablet 3     Sig: TAKE 1 TABLET TWICE A DAY WITH FOOD (NEED APPOINTMENT FOR FURTHER REFILLS)        Hypertensive Medications Protocol Passed - 6/26/2022 11:43 PM        Passed - CMP or BMP in past 12 months        Passed - Appointment in past 6 or next 3 months        Passed - GFR Non- > 50     Lab Results   Component Value Date    GFRNAA 52 (L) 05/18/2022                    CLONIDINE 0.1 MG Oral Tab [Pharmacy Med Name: CLONIDINE HCL TABS 0.1MG] 180 tablet 3     Sig: TAKE 1 TABLET TWICE A DAY        Hypertensive Medications Protocol Passed - 6/26/2022 11:43 PM        Passed - CMP or BMP in past 12 months        Passed - Appointment in past 6 or next 3 months        Passed - GFR Non- > 50     Lab Results   Component Value Date    GFRNAA 52 (L) 05/18/2022                        Recent Outpatient Visits              2 weeks ago Medicare annual wellness visit, subsequent    [place] Hiram Caldera MD    Office Visit    1 month ago Leg swelling    [address] MD    Office Visit    1 month ago Onychomycosis    [facility], [address], [address] [facility], [place], [place]    Office Visit    9 months ago Skin lesion    [facility], [address], [place]    Office Visit    11 months ago Venous stasis dermatitis of both lower extremities    [address] MD    Office Visit             Future Appointments         Provider Department Appt Notes    In 1 month Edy Abebe, [address] [facility]  [address], [place] follow up

## 2022-06-28 RX ORDER — ALPRAZOLAM 0.25 MG/1
0.25 TABLET ORAL 2 TIMES DAILY PRN
Qty: 60 TABLET | Refills: 0 | Status: SHIPPED | OUTPATIENT
Start: 2022-06-28

## 2022-06-28 NOTE — TELEPHONE ENCOUNTER
Patient contacts clinic requesting refill of alprazolam.  States she feels nervous being alone. Denies chest pain or shortness of breath. Denies heart racing, numbness or tingling, denies dizziness or lightheadedness. Has 1 pill left from old rx. Denies depressive thoughts, SI or self harm. Last seen 6/8.   Please advise on refill request.

## 2022-08-10 ENCOUNTER — OFFICE VISIT (OUTPATIENT)
Dept: PODIATRY CLINIC | Facility: CLINIC | Age: 87
End: 2022-08-10
Payer: MEDICARE

## 2022-08-10 DIAGNOSIS — M20.41 HAMMERTOES OF BOTH FEET: ICD-10-CM

## 2022-08-10 DIAGNOSIS — B35.1 ONYCHOMYCOSIS: Primary | ICD-10-CM

## 2022-08-10 DIAGNOSIS — M79.671 BILATERAL FOOT PAIN: ICD-10-CM

## 2022-08-10 DIAGNOSIS — M20.42 HAMMERTOES OF BOTH FEET: ICD-10-CM

## 2022-08-10 DIAGNOSIS — R26.81 GAIT INSTABILITY: ICD-10-CM

## 2022-08-10 DIAGNOSIS — M79.672 BILATERAL FOOT PAIN: ICD-10-CM

## 2022-08-10 DIAGNOSIS — L84 FOOT CALLUS: ICD-10-CM

## 2022-08-22 ENCOUNTER — APPOINTMENT (OUTPATIENT)
Dept: GENERAL RADIOLOGY | Facility: HOSPITAL | Age: 87
End: 2022-08-22
Attending: EMERGENCY MEDICINE
Payer: MEDICARE

## 2022-08-22 ENCOUNTER — APPOINTMENT (OUTPATIENT)
Dept: GENERAL RADIOLOGY | Facility: HOSPITAL | Age: 87
DRG: 872 | End: 2022-08-22
Attending: EMERGENCY MEDICINE
Payer: MEDICARE

## 2022-08-22 ENCOUNTER — APPOINTMENT (OUTPATIENT)
Dept: CT IMAGING | Facility: HOSPITAL | Age: 87
End: 2022-08-22
Attending: EMERGENCY MEDICINE
Payer: MEDICARE

## 2022-08-22 ENCOUNTER — HOSPITAL ENCOUNTER (INPATIENT)
Facility: HOSPITAL | Age: 87
LOS: 5 days | Discharge: NURSING, REHAB OR LONG-TERM CARE | DRG: 872 | End: 2022-08-27
Attending: EMERGENCY MEDICINE | Admitting: HOSPITALIST
Payer: MEDICARE

## 2022-08-22 ENCOUNTER — HOSPITAL ENCOUNTER (INPATIENT)
Facility: HOSPITAL | Age: 87
LOS: 5 days | Discharge: NURSING, REHAB OR LONG-TERM CARE | End: 2022-08-27
Attending: EMERGENCY MEDICINE | Admitting: HOSPITALIST
Payer: MEDICARE

## 2022-08-22 ENCOUNTER — APPOINTMENT (OUTPATIENT)
Dept: CT IMAGING | Facility: HOSPITAL | Age: 87
DRG: 872 | End: 2022-08-22
Attending: EMERGENCY MEDICINE
Payer: MEDICARE

## 2022-08-22 DIAGNOSIS — R53.1 WEAKNESS GENERALIZED: Primary | ICD-10-CM

## 2022-08-22 LAB
ALBUMIN SERPL-MCNC: 3.1 G/DL (ref 3.4–5)
ALP LIVER SERPL-CCNC: 117 U/L
ALT SERPL-CCNC: 30 U/L
ANION GAP SERPL CALC-SCNC: 9 MMOL/L (ref 0–18)
AST SERPL-CCNC: 48 U/L (ref 15–37)
BASOPHILS # BLD AUTO: 0.03 X10(3) UL (ref 0–0.2)
BASOPHILS NFR BLD AUTO: 0.4 %
BILIRUB DIRECT SERPL-MCNC: 0.1 MG/DL (ref 0–0.2)
BILIRUB SERPL-MCNC: 0.9 MG/DL (ref 0.1–2)
BILIRUB UR QL CFM: NEGATIVE
BUN BLD-MCNC: 42 MG/DL (ref 7–18)
BUN/CREAT SERPL: 36.2 (ref 10–20)
CALCIUM BLD-MCNC: 9.5 MG/DL (ref 8.5–10.1)
CHLORIDE SERPL-SCNC: 112 MMOL/L (ref 98–112)
CO2 SERPL-SCNC: 19 MMOL/L (ref 21–32)
COLOR UR: YELLOW
CREAT BLD-MCNC: 1.16 MG/DL
DEPRECATED RDW RBC AUTO: 48.2 FL (ref 35.1–46.3)
EOSINOPHIL # BLD AUTO: 0.01 X10(3) UL (ref 0–0.7)
EOSINOPHIL NFR BLD AUTO: 0.1 %
ERYTHROCYTE [DISTWIDTH] IN BLOOD BY AUTOMATED COUNT: 14.3 % (ref 11–15)
GFR SERPLBLD BASED ON 1.73 SQ M-ARVRAT: 43 ML/MIN/1.73M2 (ref 60–?)
GLUCOSE BLD-MCNC: 109 MG/DL (ref 70–99)
GLUCOSE UR-MCNC: NEGATIVE MG/DL
HCT VFR BLD AUTO: 45 %
HGB BLD-MCNC: 14.8 G/DL
IMM GRANULOCYTES # BLD AUTO: 0.03 X10(3) UL (ref 0–1)
IMM GRANULOCYTES NFR BLD: 0.4 %
KETONES UR-MCNC: 15 MG/DL
LYMPHOCYTES # BLD AUTO: 0.51 X10(3) UL (ref 1–4)
LYMPHOCYTES NFR BLD AUTO: 6 %
MCH RBC QN AUTO: 31.8 PG (ref 26–34)
MCHC RBC AUTO-ENTMCNC: 32.9 G/DL (ref 31–37)
MCV RBC AUTO: 96.6 FL
MONOCYTES # BLD AUTO: 0.66 X10(3) UL (ref 0.1–1)
MONOCYTES NFR BLD AUTO: 7.8 %
NEUTROPHILS # BLD AUTO: 7.25 X10 (3) UL (ref 1.5–7.7)
NEUTROPHILS # BLD AUTO: 7.25 X10(3) UL (ref 1.5–7.7)
NEUTROPHILS NFR BLD AUTO: 85.3 %
NITRITE UR QL STRIP.AUTO: POSITIVE
OSMOLALITY SERPL CALC.SUM OF ELEC: 301 MOSM/KG (ref 275–295)
PH UR: 8.5 [PH] (ref 5–8)
PLATELET # BLD AUTO: 272 10(3)UL (ref 150–450)
POTASSIUM SERPL-SCNC: 4.9 MMOL/L (ref 3.5–5.1)
PROT SERPL-MCNC: 7.6 G/DL (ref 6.4–8.2)
PROT UR-MCNC: >=300 MG/DL
RBC # BLD AUTO: 4.66 X10(6)UL
RBC #/AREA URNS AUTO: >10 /HPF
RBC #/AREA URNS AUTO: >10 /HPF
SARS-COV-2 RNA RESP QL NAA+PROBE: NOT DETECTED
SODIUM SERPL-SCNC: 140 MMOL/L (ref 136–145)
SP GR UR STRIP: 1.01 (ref 1–1.03)
UROBILINOGEN UR STRIP-ACNC: 1
WBC # BLD AUTO: 8.5 X10(3) UL (ref 4–11)
WBC #/AREA URNS AUTO: >50 /HPF
WBC #/AREA URNS AUTO: >50 /HPF

## 2022-08-22 PROCEDURE — 99223 1ST HOSP IP/OBS HIGH 75: CPT | Performed by: HOSPITALIST

## 2022-08-22 PROCEDURE — 72125 CT NECK SPINE W/O DYE: CPT | Performed by: EMERGENCY MEDICINE

## 2022-08-22 PROCEDURE — 70450 CT HEAD/BRAIN W/O DYE: CPT | Performed by: EMERGENCY MEDICINE

## 2022-08-22 PROCEDURE — 71045 X-RAY EXAM CHEST 1 VIEW: CPT | Performed by: EMERGENCY MEDICINE

## 2022-08-22 RX ORDER — HEPARIN SODIUM 5000 [USP'U]/ML
5000 INJECTION, SOLUTION INTRAVENOUS; SUBCUTANEOUS EVERY 12 HOURS SCHEDULED
Status: DISCONTINUED | OUTPATIENT
Start: 2022-08-22 | End: 2022-08-27

## 2022-08-22 RX ORDER — LABETALOL HYDROCHLORIDE 5 MG/ML
10 INJECTION, SOLUTION INTRAVENOUS ONCE
Status: COMPLETED | OUTPATIENT
Start: 2022-08-22 | End: 2022-08-22

## 2022-08-22 RX ORDER — HYDROCHLOROTHIAZIDE 12.5 MG/1
12.5 CAPSULE, GELATIN COATED ORAL DAILY
Status: DISCONTINUED | OUTPATIENT
Start: 2022-08-22 | End: 2022-08-22

## 2022-08-22 RX ORDER — ALPRAZOLAM 0.25 MG/1
0.25 TABLET ORAL 2 TIMES DAILY PRN
Status: DISCONTINUED | OUTPATIENT
Start: 2022-08-22 | End: 2022-08-27

## 2022-08-22 RX ORDER — HYDROCHLOROTHIAZIDE 12.5 MG/1
12.5 TABLET ORAL DAILY
Status: DISCONTINUED | OUTPATIENT
Start: 2022-08-22 | End: 2022-08-27

## 2022-08-22 RX ORDER — ACETAMINOPHEN 500 MG
500 TABLET ORAL EVERY 4 HOURS PRN
Status: DISCONTINUED | OUTPATIENT
Start: 2022-08-22 | End: 2022-08-27

## 2022-08-22 RX ORDER — ATORVASTATIN CALCIUM 40 MG/1
40 TABLET, FILM COATED ORAL NIGHTLY
Status: DISCONTINUED | OUTPATIENT
Start: 2022-08-22 | End: 2022-08-27

## 2022-08-22 RX ORDER — HYDRALAZINE HYDROCHLORIDE 50 MG/1
50 TABLET, FILM COATED ORAL 3 TIMES DAILY
Status: DISCONTINUED | OUTPATIENT
Start: 2022-08-22 | End: 2022-08-27

## 2022-08-22 RX ORDER — LOSARTAN POTASSIUM 100 MG/1
100 TABLET ORAL DAILY
Status: DISCONTINUED | OUTPATIENT
Start: 2022-08-22 | End: 2022-08-27

## 2022-08-22 RX ORDER — SODIUM CHLORIDE 9 MG/ML
100 INJECTION, SOLUTION INTRAVENOUS CONTINUOUS
Status: DISCONTINUED | OUTPATIENT
Start: 2022-08-22 | End: 2022-08-23

## 2022-08-22 RX ORDER — SODIUM CHLORIDE 9 MG/ML
INJECTION, SOLUTION INTRAVENOUS CONTINUOUS
Status: DISCONTINUED | OUTPATIENT
Start: 2022-08-22 | End: 2022-08-24

## 2022-08-22 RX ORDER — CALCIUM CARBONATE/VITAMIN D3 250-3.125
1 TABLET ORAL DAILY
Status: DISCONTINUED | OUTPATIENT
Start: 2022-08-22 | End: 2022-08-27

## 2022-08-22 RX ORDER — ONDANSETRON 2 MG/ML
4 INJECTION INTRAMUSCULAR; INTRAVENOUS EVERY 6 HOURS PRN
Status: DISCONTINUED | OUTPATIENT
Start: 2022-08-22 | End: 2022-08-27

## 2022-08-22 RX ORDER — CARVEDILOL 25 MG/1
25 TABLET ORAL 2 TIMES DAILY WITH MEALS
Status: DISCONTINUED | OUTPATIENT
Start: 2022-08-22 | End: 2022-08-27

## 2022-08-22 RX ORDER — CLONIDINE HYDROCHLORIDE 0.1 MG/1
0.1 TABLET ORAL 2 TIMES DAILY
Status: DISCONTINUED | OUTPATIENT
Start: 2022-08-22 | End: 2022-08-27

## 2022-08-22 NOTE — H&P
[facility]    PATIENT'S NAME: Cristina Kawasaki   ATTENDING PHYSICIAN: Nathan Yang. Hank Cardenas MD   PATIENT ACCOUNT#:   [id number]    LOCATION:  [place] [address]  MEDICAL RECORD #:   [id number]       YOB: 1927  ADMISSION DATE:       08/22/2022    HISTORY AND PHYSICAL EXAMINATION    CHIEF COMPLAINT:  Urinary tract infection, fall, and encephalopathy. HISTORY OF PRESENT ILLNESS:  Patient is a 80-year-old  female who lives in an apartment, and her sons check on her frequently. Today, she was found lying in the bathroom. Last time her son spoke to her was last night. Brought into the emergency department for evaluation. Chemistry was unremarkable. Liver function tests unremarkable. Chemistry showed GFR of 43, bicarb 19, BUN and creatinine of 42 and 1.16. GFR is below baseline. Urinalysis showed evidence of urinary tract infection. CT scan of the cervical spine showed degenerative joint disease but no acute fracture. Chest x-ray showed reticular opacities, bilateral.  Could be interstitial edema, but patient appears clinically dry. CT scan of the brain showed no acute intracranial findings, moderate chronic white matter microvascular changes, and calcified meningioma, left posterior temporoparietal region with localized mass effect upon adjacent sulci. No perilesional edema. Patient was started on empirically on IV Rocephin, and she will be admitted to the hospital for further management. PAST MEDICAL HISTORY:  Dementia, generalized osteoarthritis, degenerative joint disease of lumbar spine, hypertension, hyperlipidemia. She has chronic kidney disease stage 2 to 3. PAST SURGICAL HISTORY:  None. MEDICATIONS:  Please see medication reconciliation list.     ALLERGIES:  She had side effects to Norvasc. Sulfa also listed as an allergy. FAMILY HISTORY:  Patient is not able to give me details. SOCIAL HISTORY:  Ex-tobacco user. No alcohol or drug use.   Lives by herself. Usually independent for basic activities of daily living, though her son said her memory has been declining recently. REVIEW OF SYSTEMS:  Patient is not able to give me any details. According to her son, she was found today on the floor in her bathroom. It is not clear how she fell and when. Patient appeared more confused than usual to her son. Other 12-point review of systems is unobtainable. PHYSICAL EXAMINATION:    GENERAL:  Patient is alert, cooperative. No agitation. VITAL SIGNS:  Temperature 96.7, pulse 67, respiratory rate 22, blood pressure 188/85, pulse ox 96% on room air. HEENT:  Atraumatic. Oropharynx clear. Dry mucous membranes. Normal hard and soft palate. Eyes:  Anicteric sclerae. NECK:  Supple. No lymphadenopathy. Trachea midline. Full range of motion. LUNGS:  Clear to auscultation bilaterally. Normal respiratory effort. HEART:  Regular rate and rhythm. S1 and S2 auscultated. No murmur. ABDOMEN:  Soft, nondistended. No tenderness. Positive bowel sounds. EXTREMITIES:  No peripheral edema, clubbing or cyanosis. NEUROLOGIC:  Motor and sensory intact. Cranial nerves II to XII are intact. ASSESSMENT:    1. Acute encephalopathy, below baseline underlying dementia. 2.   Urinary tract infection. 3.   Hypertension. 4.   Mild metabolic acidosis. Considering the meningioma finding on CT scan, rule out any seizure disorder. PLAN:  Patient will be admitted to general medical floor. Fall precautions. IV Rocephin. IV fluids. Continue her home medications. Obtain electroencephalogram; rule out seizure disorder. Follow up on urine cultures. Further recommendations to follow.      Dictated By Mildred Castorena MD  d: 08/22/2022 17:38:09  t: 08/22/2022 17:59:11  [facility] [id number]  /

## 2022-08-22 NOTE — ED INITIAL ASSESSMENT (HPI)
Pt presents to ED via EMS after family found the pt on the floor. Pt denies falling. Per EMS pt lives alone but her family lives upstairs in a separate residence. PT denies pain or LOC. Pt smells of urine. Pt A&Ox3.

## 2022-08-22 NOTE — ED QUICK NOTES
Orders for admission, patient is aware of plan and ready to go upstairs. Any questions, please call ED RN Dontae Velasco at [address] [facility] [[email or web address] [address]. Patient Covid vaccination status: Fully vaccinated     COVID Test Ordered in ED: Rapid SARS-CoV-2 by PCR    COVID Suspicion at Admission: Low clinical suspicion for COVID    Running Infusions:  None    Mental Status/LOC at time of transport: x3    Other pertinent information: Pt lives alone, arrived in soiled clothes.    CIWA score: N/A   NIH score:  N/A

## 2022-08-23 LAB
ANION GAP SERPL CALC-SCNC: 6 MMOL/L (ref 0–18)
BASOPHILS # BLD AUTO: 0.02 X10(3) UL (ref 0–0.2)
BASOPHILS NFR BLD AUTO: 0.3 %
BUN BLD-MCNC: 47 MG/DL (ref 7–18)
BUN/CREAT SERPL: 42.3 (ref 10–20)
CALCIUM BLD-MCNC: 8.4 MG/DL (ref 8.5–10.1)
CHLORIDE SERPL-SCNC: 114 MMOL/L (ref 98–112)
CO2 SERPL-SCNC: 23 MMOL/L (ref 21–32)
CREAT BLD-MCNC: 1.11 MG/DL
DEPRECATED RDW RBC AUTO: 47.6 FL (ref 35.1–46.3)
EOSINOPHIL # BLD AUTO: 0.02 X10(3) UL (ref 0–0.7)
EOSINOPHIL NFR BLD AUTO: 0.3 %
ERYTHROCYTE [DISTWIDTH] IN BLOOD BY AUTOMATED COUNT: 14 % (ref 11–15)
GFR SERPLBLD BASED ON 1.73 SQ M-ARVRAT: 46 ML/MIN/1.73M2 (ref 60–?)
GLUCOSE BLD-MCNC: 85 MG/DL (ref 70–99)
HCT VFR BLD AUTO: 34.7 %
HGB BLD-MCNC: 11.5 G/DL
IMM GRANULOCYTES # BLD AUTO: 0.02 X10(3) UL (ref 0–1)
IMM GRANULOCYTES NFR BLD: 0.3 %
LYMPHOCYTES # BLD AUTO: 0.84 X10(3) UL (ref 1–4)
LYMPHOCYTES NFR BLD AUTO: 13.7 %
MCH RBC QN AUTO: 31.3 PG (ref 26–34)
MCHC RBC AUTO-ENTMCNC: 33.1 G/DL (ref 31–37)
MCV RBC AUTO: 94.3 FL
MONOCYTES # BLD AUTO: 0.58 X10(3) UL (ref 0.1–1)
MONOCYTES NFR BLD AUTO: 9.5 %
NEUTROPHILS # BLD AUTO: 4.63 X10 (3) UL (ref 1.5–7.7)
NEUTROPHILS # BLD AUTO: 4.63 X10(3) UL (ref 1.5–7.7)
NEUTROPHILS NFR BLD AUTO: 75.9 %
OSMOLALITY SERPL CALC.SUM OF ELEC: 308 MOSM/KG (ref 275–295)
PLATELET # BLD AUTO: 233 10(3)UL (ref 150–450)
POTASSIUM SERPL-SCNC: 3.8 MMOL/L (ref 3.5–5.1)
RBC # BLD AUTO: 3.68 X10(6)UL
SODIUM SERPL-SCNC: 143 MMOL/L (ref 136–145)
WBC # BLD AUTO: 6.1 X10(3) UL (ref 4–11)

## 2022-08-23 PROCEDURE — 99233 SBSQ HOSP IP/OBS HIGH 50: CPT | Performed by: HOSPITALIST

## 2022-08-24 LAB
ANION GAP SERPL CALC-SCNC: 5 MMOL/L (ref 0–18)
BASOPHILS # BLD AUTO: 0.03 X10(3) UL (ref 0–0.2)
BASOPHILS NFR BLD AUTO: 0.7 %
BUN BLD-MCNC: 45 MG/DL (ref 7–18)
BUN/CREAT SERPL: 38.8 (ref 10–20)
CALCIUM BLD-MCNC: 7.6 MG/DL (ref 8.5–10.1)
CHLORIDE SERPL-SCNC: 116 MMOL/L (ref 98–112)
CO2 SERPL-SCNC: 22 MMOL/L (ref 21–32)
CREAT BLD-MCNC: 1.16 MG/DL
DEPRECATED RDW RBC AUTO: 50.5 FL (ref 35.1–46.3)
EOSINOPHIL # BLD AUTO: 0.06 X10(3) UL (ref 0–0.7)
EOSINOPHIL NFR BLD AUTO: 1.3 %
ERYTHROCYTE [DISTWIDTH] IN BLOOD BY AUTOMATED COUNT: 14.4 % (ref 11–15)
GFR SERPLBLD BASED ON 1.73 SQ M-ARVRAT: 43 ML/MIN/1.73M2 (ref 60–?)
GLUCOSE BLD-MCNC: 90 MG/DL (ref 70–99)
HCT VFR BLD AUTO: 33.1 %
HGB BLD-MCNC: 10.3 G/DL
IMM GRANULOCYTES # BLD AUTO: 0.01 X10(3) UL (ref 0–1)
IMM GRANULOCYTES NFR BLD: 0.2 %
LYMPHOCYTES # BLD AUTO: 0.93 X10(3) UL (ref 1–4)
LYMPHOCYTES NFR BLD AUTO: 20.9 %
MAGNESIUM SERPL-MCNC: 2 MG/DL (ref 1.6–2.6)
MCH RBC QN AUTO: 30 PG (ref 26–34)
MCHC RBC AUTO-ENTMCNC: 31.1 G/DL (ref 31–37)
MCV RBC AUTO: 96.5 FL
MONOCYTES # BLD AUTO: 0.56 X10(3) UL (ref 0.1–1)
MONOCYTES NFR BLD AUTO: 12.6 %
NEUTROPHILS # BLD AUTO: 2.86 X10 (3) UL (ref 1.5–7.7)
NEUTROPHILS # BLD AUTO: 2.86 X10(3) UL (ref 1.5–7.7)
NEUTROPHILS NFR BLD AUTO: 64.3 %
OSMOLALITY SERPL CALC.SUM OF ELEC: 307 MOSM/KG (ref 275–295)
PHOSPHATE SERPL-MCNC: 2.7 MG/DL (ref 2.5–4.9)
PLATELET # BLD AUTO: 196 10(3)UL (ref 150–450)
POTASSIUM SERPL-SCNC: 3.4 MMOL/L (ref 3.5–5.1)
RBC # BLD AUTO: 3.43 X10(6)UL
SODIUM SERPL-SCNC: 143 MMOL/L (ref 136–145)
WBC # BLD AUTO: 4.5 X10(3) UL (ref 4–11)

## 2022-08-24 PROCEDURE — 99233 SBSQ HOSP IP/OBS HIGH 50: CPT | Performed by: HOSPITALIST

## 2022-08-24 PROCEDURE — 95816 EEG AWAKE AND DROWSY: CPT | Performed by: OTHER

## 2022-08-24 RX ORDER — POTASSIUM CHLORIDE 20 MEQ/1
40 TABLET, EXTENDED RELEASE ORAL ONCE
Status: COMPLETED | OUTPATIENT
Start: 2022-08-24 | End: 2022-08-24

## 2022-08-24 NOTE — CM/SW NOTE
BPCI Bundled Advanced Medicare Program    Plan of care reviewed for care coordination and discharge planning. Noted pt falls under  BPCI/Medicare program, with  for Urinary Tract Infection     NSOC MICHELLE Proposal:  Post Acute Care pending progress    / to remain available for support and/or discharge planning. Abigail Curry.  Michelle Khalil RN, BSN  Nurse   [phone]

## 2022-08-24 NOTE — CM/SW NOTE
Department  notified of request for veronica SANTIAGO referrals started. Assigned CM/SW to follow up with pt/family on further discharge planning.        Marlen Morris   August 24, 2022   09:35

## 2022-08-24 NOTE — CDS QUERY
.How to Answer this Query    1.) Click \"Edit\" button on the toolbar.  2.) Type an \"X\" in the bracket for the diagnosis that applies. (You may also add additional clinical details as you feel necessary to substantiate your response). 3.) Finally click \"Sign\" to complete response. Thank you    . Present on Admission (POA)  [address] [facility]    Dear Doctor Raffi Jerez:  Clinical information (provided below) does not conclusively specify if the condition was Present On Admission (POA). In order to apply the POA indicator to the final set of ICD-10-CM diagnosis codes that reflects severity of illness and risk of mortality,  SELECTION BY PROVIDER ONLY  IS SEPSIS PRESENT ON ADMISSION (POA)?      ( )  Yes, it is present on admission. ( )  No, it is not present on admission. ( )  Yes, it is present but not on admission. ( )  Other (please specify):       Clinical Indicators:  * Patient presented in the ED for evaluation of fall and UTI  * Per  (8/22/22): \"Acute encephalopathy, below baseline underlying dementia. UIT. Patient appeared more confused than usual to her son. Temperature 96.7, BUN and     creatinine of 42 and 1.16  IV fluids. IV Rocephin. Urinalysis showed evidence of UTI. Pulse 67, Respiratory rate 22, blood pressure 188/85, pulse ox 96% on room air \"  * Per Dr. Loreto Manrique (8/23/22): \"Urinary tract infection Sepsis\"    If you have any questions, please contact Clinical :  Lori Larry RN at [phone]     Thank You!     THIS FORM IS A PERMANENT PART OF THE MEDICAL RECORD  '

## 2022-08-24 NOTE — CM/SW NOTE
08/24/22 1300   CM/SW Referral Data   Referral Source Physician   Reason for Referral Discharge planning   Informant Son  Anna Jimenes and Vidya Rose at bedside)   Pertinent Medical Hx   Does patient have an established PCP? Yes  (Sean Raza)   Significant Past Medical/Mental Health Hx Dementia, Hx of falls   Patient Info   Patient's Current Mental Status at Time of Assessment Confused or unable to complete assessment;Memory Impairments   Patient's [address]   Number of Levels in Home 1   Number of Stair in Home 6 in   Patient lives with Alone   Patient Status Prior to Admission   Independent with ADLs and Mobility No   Pt. requires assistance with Housework;Driving;Meals; Ambulating   Discharge Needs   Anticipated D/C needs Subacute rehab   Services Requested   PASRR Level 1 Submitted Yes  (pending review )   Choice of Post-Acute Provider   Informed patient of right to choose their preferred provider Yes       CM met with pt and family at bedside to discuss dc plan and PT recommendation for JACK on dc. Family agrees. Per family pt has had multiple falls at home. Uses a walk and cane at bedside. Has [employer] twice aware. A 3 of pts sons live in the area and visit daily. CM explained referral process through [facility], and encourage family to view Medicare. gov for choice. Choice list to be brought to bedside by end of day and emailed to son Vidya Rose ([email or web address])  PASRR done, under review. Pt shabnam Thomas is HC-POA. HC-POA/ living will copied and sent to ED reg to be scanned into pts chart. Plan: JACK pending choice and med clearance. / to remain available   for support and/or discharge planning. Orin Fraga.  Stephenie Steinberg RN, BSN  Nurse   [phone]

## 2022-08-25 LAB
ANION GAP SERPL CALC-SCNC: 5 MMOL/L (ref 0–18)
BASOPHILS # BLD AUTO: 0.03 X10(3) UL (ref 0–0.2)
BASOPHILS NFR BLD AUTO: 0.7 %
BUN BLD-MCNC: 39 MG/DL (ref 7–18)
BUN/CREAT SERPL: 42.4 (ref 10–20)
CALCIUM BLD-MCNC: 8.2 MG/DL (ref 8.5–10.1)
CHLORIDE SERPL-SCNC: 114 MMOL/L (ref 98–112)
CO2 SERPL-SCNC: 22 MMOL/L (ref 21–32)
CREAT BLD-MCNC: 0.92 MG/DL
DEPRECATED RDW RBC AUTO: 50 FL (ref 35.1–46.3)
EOSINOPHIL # BLD AUTO: 0.11 X10(3) UL (ref 0–0.7)
EOSINOPHIL NFR BLD AUTO: 2.4 %
ERYTHROCYTE [DISTWIDTH] IN BLOOD BY AUTOMATED COUNT: 14.3 % (ref 11–15)
GFR SERPLBLD BASED ON 1.73 SQ M-ARVRAT: 57 ML/MIN/1.73M2 (ref 60–?)
GLUCOSE BLD-MCNC: 101 MG/DL (ref 70–99)
HCT VFR BLD AUTO: 35.7 %
HGB BLD-MCNC: 11.2 G/DL
IMM GRANULOCYTES # BLD AUTO: 0.02 X10(3) UL (ref 0–1)
IMM GRANULOCYTES NFR BLD: 0.4 %
LYMPHOCYTES # BLD AUTO: 0.66 X10(3) UL (ref 1–4)
LYMPHOCYTES NFR BLD AUTO: 14.6 %
MCH RBC QN AUTO: 30 PG (ref 26–34)
MCHC RBC AUTO-ENTMCNC: 31.4 G/DL (ref 31–37)
MCV RBC AUTO: 95.7 FL
MONOCYTES # BLD AUTO: 0.51 X10(3) UL (ref 0.1–1)
MONOCYTES NFR BLD AUTO: 11.3 %
NEUTROPHILS # BLD AUTO: 3.18 X10 (3) UL (ref 1.5–7.7)
NEUTROPHILS # BLD AUTO: 3.18 X10(3) UL (ref 1.5–7.7)
NEUTROPHILS NFR BLD AUTO: 70.6 %
OSMOLALITY SERPL CALC.SUM OF ELEC: 302 MOSM/KG (ref 275–295)
PLATELET # BLD AUTO: 219 10(3)UL (ref 150–450)
POTASSIUM SERPL-SCNC: 4 MMOL/L (ref 3.5–5.1)
POTASSIUM SERPL-SCNC: 4 MMOL/L (ref 3.5–5.1)
RBC # BLD AUTO: 3.73 X10(6)UL
SODIUM SERPL-SCNC: 141 MMOL/L (ref 136–145)
WBC # BLD AUTO: 4.5 X10(3) UL (ref 4–11)

## 2022-08-25 PROCEDURE — 99233 SBSQ HOSP IP/OBS HIGH 50: CPT | Performed by: HOSPITALIST

## 2022-08-25 RX ORDER — VANCOMYCIN HYDROCHLORIDE 125 MG/1
125 CAPSULE ORAL DAILY
Status: DISCONTINUED | OUTPATIENT
Start: 2022-08-25 | End: 2022-08-27

## 2022-08-25 NOTE — PROCEDURES
[address] [facility], [address]      PATIENT'S NAME: Basilia Doyle   ATTENDING PHYSICIAN: Bandar Santo DO   PATIENT ACCOUNT #: [de-identified] LOCATION: [address] [place] [address] RECORD #: [id number] YOB: 1927   DATE OF SERVICE: 08/24/2022       ELECTROENCEPHALOGRAM REPORT    DATE OF EXAMINATION:  08/24/2022  AGE: 89 Yrs. SEX: F   EEG #:      INTERPRETATION:  This is a routine awake-drowsy electroencephalogram using referential and bipolar montages. Background activity is poorly formed 8 Hz alpha activity. Occasional diffuse 6-7 Hz theta activity. Drowsiness is recorded. No stage 2 sleep. IMPRESSION:  Moderate generalized slowing of cerebral activity. No focal slowing. No focal generalized epileptiform discharges.     Dictated By Desmond Rodriuges MD  d: 08/24/2022 17:32:55  t: 08/24/2022 18:37:53  [facility] [id number]  L/

## 2022-08-26 LAB
ANION GAP SERPL CALC-SCNC: 5 MMOL/L (ref 0–18)
BASOPHILS # BLD AUTO: 0.04 X10(3) UL (ref 0–0.2)
BASOPHILS NFR BLD AUTO: 1 %
BUN BLD-MCNC: 33 MG/DL (ref 7–18)
BUN/CREAT SERPL: 39.3 (ref 10–20)
CALCIUM BLD-MCNC: 8.2 MG/DL (ref 8.5–10.1)
CHLORIDE SERPL-SCNC: 110 MMOL/L (ref 98–112)
CO2 SERPL-SCNC: 23 MMOL/L (ref 21–32)
CREAT BLD-MCNC: 0.84 MG/DL
DEPRECATED RDW RBC AUTO: 48.8 FL (ref 35.1–46.3)
EOSINOPHIL # BLD AUTO: 0.1 X10(3) UL (ref 0–0.7)
EOSINOPHIL NFR BLD AUTO: 2.5 %
ERYTHROCYTE [DISTWIDTH] IN BLOOD BY AUTOMATED COUNT: 14 % (ref 11–15)
GFR SERPLBLD BASED ON 1.73 SQ M-ARVRAT: 64 ML/MIN/1.73M2 (ref 60–?)
GLUCOSE BLD-MCNC: 98 MG/DL (ref 70–99)
HCT VFR BLD AUTO: 36 %
HGB BLD-MCNC: 11.3 G/DL
IMM GRANULOCYTES # BLD AUTO: 0.01 X10(3) UL (ref 0–1)
IMM GRANULOCYTES NFR BLD: 0.3 %
LYMPHOCYTES # BLD AUTO: 0.88 X10(3) UL (ref 1–4)
LYMPHOCYTES NFR BLD AUTO: 22.1 %
MCH RBC QN AUTO: 30 PG (ref 26–34)
MCHC RBC AUTO-ENTMCNC: 31.4 G/DL (ref 31–37)
MCV RBC AUTO: 95.5 FL
MONOCYTES # BLD AUTO: 0.46 X10(3) UL (ref 0.1–1)
MONOCYTES NFR BLD AUTO: 11.5 %
NEUTROPHILS # BLD AUTO: 2.5 X10 (3) UL (ref 1.5–7.7)
NEUTROPHILS # BLD AUTO: 2.5 X10(3) UL (ref 1.5–7.7)
NEUTROPHILS NFR BLD AUTO: 62.6 %
OSMOLALITY SERPL CALC.SUM OF ELEC: 293 MOSM/KG (ref 275–295)
PLATELET # BLD AUTO: 223 10(3)UL (ref 150–450)
POTASSIUM SERPL-SCNC: 4.2 MMOL/L (ref 3.5–5.1)
RBC # BLD AUTO: 3.77 X10(6)UL
SODIUM SERPL-SCNC: 138 MMOL/L (ref 136–145)
WBC # BLD AUTO: 4 X10(3) UL (ref 4–11)

## 2022-08-26 PROCEDURE — 99232 SBSQ HOSP IP/OBS MODERATE 35: CPT | Performed by: HOSPITALIST

## 2022-08-26 NOTE — CM/SW NOTE
Cm called POA son Kristine Avery [phone] for update on dc planning and obtain choice. Gilson Padmini he did not receive the MICHEL list, \"my internet is down\". Kristine Bennie asked for a copy to be left at bedside with  contact info. SUSSY notified Kristine Avery that we will need top 2-3 choices in priority order, Kristine Avery vu.    CM left copy at bedside with Friday contact info and weekend contact info for SW. Duongmckennaayah Bennie requested to speak with MD, sussy notified Dr Tiffanie De Santiago. [address] called with his choices for michel. #1 is [facility], #2 is [facility]. CM reserved [facility] in [place] and sent clinical upd. [facility] done    / to remain available for support and/or discharge planning.      Lucas Macdonald, RN    Ext [phone]

## 2022-08-27 VITALS
WEIGHT: 134.69 LBS | HEIGHT: 65 IN | DIASTOLIC BLOOD PRESSURE: 64 MMHG | TEMPERATURE: 98 F | OXYGEN SATURATION: 95 % | HEART RATE: 66 BPM | RESPIRATION RATE: 16 BRPM | SYSTOLIC BLOOD PRESSURE: 140 MMHG | BODY MASS INDEX: 22.44 KG/M2

## 2022-08-27 LAB — SARS-COV-2 RNA RESP QL NAA+PROBE: NOT DETECTED

## 2022-08-27 PROCEDURE — 99232 SBSQ HOSP IP/OBS MODERATE 35: CPT | Performed by: HOSPITALIST

## 2022-08-27 NOTE — CM/SW NOTE
08/27/22 1312   Discharge disposition   Expected discharge disposition [facility] Provider   ([address])   Discharge transportation [facility]     Pt discussed during nursing rounds. Pt is stable for dc today. MD dc order entered. Pt will dc to [facility] [address] for J[facility], liatal Zaragoza confirmed bed is available today. Pt's POA/son notified and agreeable w/transfer today. [facility] scheduled for 4pm . Number for nurse report is [phone]. Plan: [facility] for J[facility] today. / to remain available for support and/or discharge planning.      GARCÍA CarmenN    [phone]

## 2022-08-27 NOTE — PHYSICAL THERAPY NOTE
PHYSICAL THERAPY TREATMENT NOTE - INPATIENT     Room Number: 573/573-A       Presenting Problem: generalized weakness, recent fall at home  Co-Morbidities : dementia    Problem List  Principal Problem:    Weakness generalized      PHYSICAL THERAPY ASSESSMENT     Pt seen supine in bed with son at side and agreeable to therapy. Pt transferred supine to sit at EOB with mod A, pt did most of the shifting to edge and was able to help the last bit to come upright. Pt worked on scooting , building up proprioception, weight shifting. With the assistance of the son, worked on sit to stand multiple times, about 6 x . Worked with educating proper body mechanics, pt edwige to stand and clear her bottom off the bed , yet pt standing in a very awkward position, she is afraid and needs mod tactile cues to keep upright as much as possible. Pt will stand for 12-15 sec at a time. Worked on hand -positioning. Returned to bed supine with max A . Required max A for rolling and postioning. Tolerated fair, son there the whole time assisting. Left with all needs aside. The patient's Approx Degree of Impairment: 81.38% has been calculated based on documentation in the [facility] '6 clicks' Inpatient Basic Mobility Short Form. Research supports that patients with this level of impairment may benefit from DC to sub-acute rehab . Pt does live alone and needs to thrive for prior level of function. DISCHARGE RECOMMENDATIONS  PT Discharge Recommendations: Sub-acute rehabilitation     PLAN  PT Treatment Plan: Bed mobility; Body mechanics; Endurance; Energy conservation;Patient education;Range of motion;Transfer training  Frequency (Obs): 3-5x/week    SUBJECTIVE  Yes I want to do    OBJECTIVE  Precautions: Bed/chair alarm    WEIGHT BEARING RESTRICTION                PAIN ASSESSMENT   Rating: Unable to rate          BALANCE  Static Sitting: Poor +  Dynamic Sitting: Poor  Static Standing: Poor -  Dynamic Standing: Not tested    ACTIVITY TOLERANCE O2 WALK       AM-PAC '6-Clicks' INPATIENT SHORT FORM - BASIC MOBILITY  How much difficulty does the patient currently have. .. Patient Difficulty: Turning over in bed (including adjusting bedclothes, sheets and blankets)?: A Lot   Patient Difficulty: Sitting down on and standing up from a chair with arms (e.g., wheelchair, bedside commode, etc.): A Lot   Patient Difficulty: Moving from lying on back to sitting on the side of the bed?: A Lot   How much help from another person does the patient currently need. .. Help from Another: Moving to and from a bed to a chair (including a wheelchair)?: Total   Help from Another: Need to walk in hospital room?: Total   Help from Another: Climbing 3-5 steps with a railing?: Total     AM-PAC Score:  Raw Score: 9   Approx Degree of Impairment: 81.38%   Standardized Score (AM-PAC Scale): 30.55   CMS Modifier (G-Code): CM    FUNCTIONAL ABILITY STATUS  Functional Mobility/Gait Assessment  Gait Assistance: Not tested    Additional information:     THERAPEUTIC EXERCISES  Lower Extremity    Position      Patient End of Session: Needs met;Call light within reach; In bed;Alarm set    CURRENT GOALS     Goals to be met by: 9/6/22  Patient Goal Patient's self-stated goal is: return to PLOF   Goal #1 Patient is able to demonstrate supine - sit EOB @ level: moderate assistance     Goal #1   Current Status Max - mod A    Goal #2 Patient is able to demonstrate transfers EOB to/from Chair/Wheelchair at assistance level: moderate assistance with walker - rolling     Goal #2  Current Status Total A   Goal #3 Patient will tolerate 1 minute of static standing with bilateral UE support on RW requiring Mod A x 1 for balance to prepare for ambulation   Goal #3   Current Status Max A    Goal #4 Patient is able to ambulate 10 feet with assist device: walker - rolling at assistance level: moderate assistance   Goal #4   Current Status NT   Goal #5 Patient to demonstrate independence with home activity/exercise instructions provided to patient in preparation for discharge.    Goal #5   Current Status

## 2022-08-28 ENCOUNTER — APPOINTMENT (OUTPATIENT)
Dept: CT IMAGING | Facility: HOSPITAL | Age: 87
End: 2022-08-28
Attending: EMERGENCY MEDICINE
Payer: MEDICARE

## 2022-08-28 ENCOUNTER — HOSPITAL ENCOUNTER (OUTPATIENT)
Facility: HOSPITAL | Age: 87
Setting detail: OBSERVATION
Discharge: NURSING, REHAB OR LONG-TERM CARE | End: 2022-08-30
Attending: EMERGENCY MEDICINE | Admitting: HOSPITALIST
Payer: MEDICARE

## 2022-08-28 ENCOUNTER — APPOINTMENT (OUTPATIENT)
Dept: MRI IMAGING | Facility: HOSPITAL | Age: 87
End: 2022-08-28
Attending: Other
Payer: MEDICARE

## 2022-08-28 DIAGNOSIS — R41.82 ALTERED MENTAL STATUS, UNSPECIFIED ALTERED MENTAL STATUS TYPE: Primary | ICD-10-CM

## 2022-08-28 LAB
ANION GAP SERPL CALC-SCNC: 5 MMOL/L (ref 0–18)
APTT PPP: 35.9 SECONDS (ref 23.3–35.6)
BASOPHILS # BLD AUTO: 0.04 X10(3) UL (ref 0–0.2)
BASOPHILS NFR BLD AUTO: 0.6 %
BILIRUB UR QL: NEGATIVE
BUN BLD-MCNC: 34 MG/DL (ref 7–18)
BUN/CREAT SERPL: 36.6 (ref 10–20)
CALCIUM BLD-MCNC: 8.9 MG/DL (ref 8.5–10.1)
CHLORIDE SERPL-SCNC: 104 MMOL/L (ref 98–112)
CLARITY UR: CLEAR
CO2 SERPL-SCNC: 24 MMOL/L (ref 21–32)
COLOR UR: YELLOW
CREAT BLD-MCNC: 0.93 MG/DL
CRP SERPL-MCNC: 1.12 MG/DL (ref ?–0.3)
DEPRECATED RDW RBC AUTO: 47.2 FL (ref 35.1–46.3)
EOSINOPHIL # BLD AUTO: 0.13 X10(3) UL (ref 0–0.7)
EOSINOPHIL NFR BLD AUTO: 1.8 %
ERYTHROCYTE [DISTWIDTH] IN BLOOD BY AUTOMATED COUNT: 13.7 % (ref 11–15)
ERYTHROCYTE [SEDIMENTATION RATE] IN BLOOD: 50 MM/HR
GFR SERPLBLD BASED ON 1.73 SQ M-ARVRAT: 57 ML/MIN/1.73M2 (ref 60–?)
GLUCOSE BLD-MCNC: 112 MG/DL (ref 70–99)
GLUCOSE BLDC GLUCOMTR-MCNC: 108 MG/DL (ref 70–99)
GLUCOSE UR-MCNC: NEGATIVE MG/DL
HCT VFR BLD AUTO: 45.1 %
HGB BLD-MCNC: 14.5 G/DL
HGB UR QL STRIP.AUTO: NEGATIVE
IMM GRANULOCYTES # BLD AUTO: 0.03 X10(3) UL (ref 0–1)
IMM GRANULOCYTES NFR BLD: 0.4 %
INR BLD: 0.99 (ref 0.85–1.16)
KETONES UR-MCNC: NEGATIVE MG/DL
LEUKOCYTE ESTERASE UR QL STRIP.AUTO: NEGATIVE
LYMPHOCYTES # BLD AUTO: 1.07 X10(3) UL (ref 1–4)
LYMPHOCYTES NFR BLD AUTO: 14.8 %
MCH RBC QN AUTO: 30.3 PG (ref 26–34)
MCHC RBC AUTO-ENTMCNC: 32.2 G/DL (ref 31–37)
MCV RBC AUTO: 94.2 FL
MONOCYTES # BLD AUTO: 0.66 X10(3) UL (ref 0.1–1)
MONOCYTES NFR BLD AUTO: 9.1 %
NEUTROPHILS # BLD AUTO: 5.29 X10 (3) UL (ref 1.5–7.7)
NEUTROPHILS # BLD AUTO: 5.29 X10(3) UL (ref 1.5–7.7)
NEUTROPHILS NFR BLD AUTO: 73.3 %
NITRITE UR QL STRIP.AUTO: NEGATIVE
OSMOLALITY SERPL CALC.SUM OF ELEC: 284 MOSM/KG (ref 275–295)
PH UR: 6 [PH] (ref 5–8)
PLATELET # BLD AUTO: 311 10(3)UL (ref 150–450)
POTASSIUM SERPL-SCNC: 4.4 MMOL/L (ref 3.5–5.1)
PROCALCITONIN SERPL-MCNC: 0.04 NG/ML (ref ?–0.16)
PROTHROMBIN TIME: 13 SECONDS (ref 11.6–14.8)
RBC # BLD AUTO: 4.79 X10(6)UL
SARS-COV-2 RNA RESP QL NAA+PROBE: NOT DETECTED
SODIUM SERPL-SCNC: 133 MMOL/L (ref 136–145)
SP GR UR STRIP: 1.02 (ref 1–1.03)
T4 FREE SERPL-MCNC: 1.3 NG/DL (ref 0.8–1.7)
TSI SER-ACNC: 6.65 MIU/ML (ref 0.36–3.74)
UROBILINOGEN UR STRIP-ACNC: 0.2
WBC # BLD AUTO: 7.2 X10(3) UL (ref 4–11)

## 2022-08-28 PROCEDURE — 70450 CT HEAD/BRAIN W/O DYE: CPT | Performed by: EMERGENCY MEDICINE

## 2022-08-28 PROCEDURE — 99220 INITIAL OBSERVATION CARE,LEVL III: CPT | Performed by: HOSPITALIST

## 2022-08-28 PROCEDURE — 70551 MRI BRAIN STEM W/O DYE: CPT | Performed by: OTHER

## 2022-08-28 RX ORDER — LEVETIRACETAM 500 MG/5ML
500 INJECTION, SOLUTION, CONCENTRATE INTRAVENOUS EVERY 12 HOURS
Status: DISCONTINUED | OUTPATIENT
Start: 2022-08-28 | End: 2022-08-28

## 2022-08-28 RX ORDER — HYDRALAZINE HYDROCHLORIDE 20 MG/ML
10 INJECTION INTRAMUSCULAR; INTRAVENOUS ONCE
Status: COMPLETED | OUTPATIENT
Start: 2022-08-28 | End: 2022-08-28

## 2022-08-28 RX ORDER — ACETAMINOPHEN 500 MG
500 TABLET ORAL EVERY 4 HOURS PRN
Status: DISCONTINUED | OUTPATIENT
Start: 2022-08-28 | End: 2022-08-30

## 2022-08-28 RX ORDER — SENNOSIDES 8.6 MG
17.2 TABLET ORAL NIGHTLY PRN
Status: DISCONTINUED | OUTPATIENT
Start: 2022-08-28 | End: 2022-08-30

## 2022-08-28 RX ORDER — BISACODYL 10 MG
10 SUPPOSITORY, RECTAL RECTAL
Status: DISCONTINUED | OUTPATIENT
Start: 2022-08-28 | End: 2022-08-30

## 2022-08-28 RX ORDER — CLONIDINE HYDROCHLORIDE 0.1 MG/1
0.1 TABLET ORAL 2 TIMES DAILY
Status: DISCONTINUED | OUTPATIENT
Start: 2022-08-28 | End: 2022-08-30

## 2022-08-28 RX ORDER — HALOPERIDOL 5 MG/ML
2.5 INJECTION INTRAMUSCULAR ONCE
Status: COMPLETED | OUTPATIENT
Start: 2022-08-28 | End: 2022-08-28

## 2022-08-28 RX ORDER — CARVEDILOL 25 MG/1
25 TABLET ORAL 2 TIMES DAILY WITH MEALS
Status: DISCONTINUED | OUTPATIENT
Start: 2022-08-28 | End: 2022-08-30

## 2022-08-28 RX ORDER — SODIUM PHOSPHATE, DIBASIC AND SODIUM PHOSPHATE, MONOBASIC 7; 19 G/133ML; G/133ML
1 ENEMA RECTAL ONCE AS NEEDED
Status: DISCONTINUED | OUTPATIENT
Start: 2022-08-28 | End: 2022-08-30

## 2022-08-28 RX ORDER — SODIUM CHLORIDE 9 MG/ML
INJECTION, SOLUTION INTRAVENOUS CONTINUOUS
Status: DISCONTINUED | OUTPATIENT
Start: 2022-08-28 | End: 2022-08-30

## 2022-08-28 RX ORDER — LORAZEPAM 2 MG/ML
2 INJECTION INTRAMUSCULAR ONCE
Status: COMPLETED | OUTPATIENT
Start: 2022-08-28 | End: 2022-08-28

## 2022-08-28 RX ORDER — ONDANSETRON 2 MG/ML
4 INJECTION INTRAMUSCULAR; INTRAVENOUS EVERY 6 HOURS PRN
Status: DISCONTINUED | OUTPATIENT
Start: 2022-08-28 | End: 2022-08-30

## 2022-08-28 RX ORDER — HYDRALAZINE HYDROCHLORIDE 50 MG/1
50 TABLET, FILM COATED ORAL 3 TIMES DAILY
Status: DISCONTINUED | OUTPATIENT
Start: 2022-08-28 | End: 2022-08-30

## 2022-08-28 RX ORDER — HYDROCHLOROTHIAZIDE 12.5 MG/1
12.5 CAPSULE, GELATIN COATED ORAL DAILY
Status: DISCONTINUED | OUTPATIENT
Start: 2022-08-29 | End: 2022-08-29

## 2022-08-28 RX ORDER — DIAZEPAM 5 MG/ML
10 INJECTION, SOLUTION INTRAMUSCULAR; INTRAVENOUS ONCE AS NEEDED
Status: ACTIVE | OUTPATIENT
Start: 2022-08-28 | End: 2022-08-28

## 2022-08-28 RX ORDER — POLYETHYLENE GLYCOL 3350 17 G/17G
17 POWDER, FOR SOLUTION ORAL DAILY PRN
Status: DISCONTINUED | OUTPATIENT
Start: 2022-08-28 | End: 2022-08-30

## 2022-08-28 RX ORDER — LOSARTAN POTASSIUM 100 MG/1
100 TABLET ORAL DAILY
Status: DISCONTINUED | OUTPATIENT
Start: 2022-08-29 | End: 2022-08-30

## 2022-08-28 RX ORDER — LEVETIRACETAM 500 MG/5ML
500 INJECTION, SOLUTION, CONCENTRATE INTRAVENOUS EVERY 12 HOURS
Status: DISCONTINUED | OUTPATIENT
Start: 2022-08-28 | End: 2022-08-30

## 2022-08-28 RX ORDER — HEPARIN SODIUM 5000 [USP'U]/ML
5000 INJECTION, SOLUTION INTRAVENOUS; SUBCUTANEOUS EVERY 8 HOURS SCHEDULED
Status: DISCONTINUED | OUTPATIENT
Start: 2022-08-28 | End: 2022-08-30

## 2022-08-28 NOTE — ED QUICK NOTES
Per MD stroke alert canceled at this time. Patient alert moving all extremities equally. Unknown baseline. Patient able to state name and unable to follow any directions. Will continue to monitor.

## 2022-08-28 NOTE — ED QUICK NOTES
Orders for admission, patient is aware of plan and ready to go upstairs. Any questions, please call ED RN Fidelia at extension [phone].      Patient Covid vaccination status: Fully vaccinated     COVID Test Ordered in ED: Rapid SARS-CoV-2 by PCR    COVID Suspicion at Admission: Low clinical suspicion for COVID    Running Infusions:  None    Mental Status/LOC at time of transport: A&OX1    Other pertinent information:   CIWA score: N/A   NIH score:  N/A

## 2022-08-28 NOTE — ED QUICK NOTES
Family at bedside. Linen changed and patient cleaned up. Purwick placed at this time. Awaiting admission.

## 2022-08-28 NOTE — PROGRESS NOTES
Brief neurology progress note    69-year-old woman with past medical history of dementia, a, nxiety hyperlipidemia, hypertension, CKD, prior history of smoking, sciatica, venous stasis dermatitis, macular degeneration, and osteoarthritis who now presents encephalopathic from her nursing home the day after she was discharged for a sepsis secondary to UTI. Patient was put on ceftriaxone and per family she was near her baseline or at her baseline when she was discharged yesterday. Per the ED note around 10 AM the nursing home staff noted the patient was confused and incoherent. This was similar to her initial presentation about a week ago. Per the ED note she does not answer questions and follow some commands intermittently. Stat MRI of the brain was done to evaluate for posterior reversible leukoencephalopathy syndrome (PRES) which can occur in the setting of significant hypertension. 3 sequence MRI was negative for PRES. She may be hypertensive due to a postictal state. Plan:  1. Start Keppra empirically; will renally dose  2. Nicardipine drip. Systolic blood pressure should not be less than 180 (less than 25% drop in 24 hours). 3. Seizure precautions  4. Delirium precautions  5.  Hold ceftriaxone if she is not already completed a course      Full consult to follow    Kris Herrera, DO  Staff Vascular & General Neurology

## 2022-08-28 NOTE — ED INITIAL ASSESSMENT (HPI)
Patient arrives via ems from [facility] with c/o altered mental status. Per staff last known well at 10:00am. Stroke alert called prior to arrival at 11:31. Patient alert on arrival. MD at bedside.

## 2022-08-29 ENCOUNTER — INITIAL APN SNF VISIT (OUTPATIENT)
Dept: INTERNAL MEDICINE CLINIC | Facility: SKILLED NURSING FACILITY | Age: 87
End: 2022-08-29

## 2022-08-29 VITALS
DIASTOLIC BLOOD PRESSURE: 78 MMHG | SYSTOLIC BLOOD PRESSURE: 166 MMHG | HEART RATE: 97 BPM | TEMPERATURE: 98 F | RESPIRATION RATE: 20 BRPM | OXYGEN SATURATION: 97 %

## 2022-08-29 DIAGNOSIS — R41.82 ALTERED MENTAL STATUS, UNSPECIFIED ALTERED MENTAL STATUS TYPE: ICD-10-CM

## 2022-08-29 LAB
ANION GAP SERPL CALC-SCNC: 6 MMOL/L (ref 0–18)
BASOPHILS # BLD AUTO: 0.02 X10(3) UL (ref 0–0.2)
BASOPHILS NFR BLD AUTO: 0.4 %
BUN BLD-MCNC: 31 MG/DL (ref 7–18)
BUN/CREAT SERPL: 38.8 (ref 10–20)
CALCIUM BLD-MCNC: 8.3 MG/DL (ref 8.5–10.1)
CHLORIDE SERPL-SCNC: 109 MMOL/L (ref 98–112)
CO2 SERPL-SCNC: 24 MMOL/L (ref 21–32)
CREAT BLD-MCNC: 0.8 MG/DL
DEPRECATED RDW RBC AUTO: 46.1 FL (ref 35.1–46.3)
EOSINOPHIL # BLD AUTO: 0.04 X10(3) UL (ref 0–0.7)
EOSINOPHIL NFR BLD AUTO: 0.8 %
ERYTHROCYTE [DISTWIDTH] IN BLOOD BY AUTOMATED COUNT: 13.5 % (ref 11–15)
GFR SERPLBLD BASED ON 1.73 SQ M-ARVRAT: 68 ML/MIN/1.73M2 (ref 60–?)
GLUCOSE BLD-MCNC: 91 MG/DL (ref 70–99)
HCT VFR BLD AUTO: 37.9 %
HGB BLD-MCNC: 12.6 G/DL
IMM GRANULOCYTES # BLD AUTO: 0.02 X10(3) UL (ref 0–1)
IMM GRANULOCYTES NFR BLD: 0.4 %
LYMPHOCYTES # BLD AUTO: 0.99 X10(3) UL (ref 1–4)
LYMPHOCYTES NFR BLD AUTO: 19.4 %
MCH RBC QN AUTO: 30.9 PG (ref 26–34)
MCHC RBC AUTO-ENTMCNC: 33.2 G/DL (ref 31–37)
MCV RBC AUTO: 92.9 FL
MONOCYTES # BLD AUTO: 0.63 X10(3) UL (ref 0.1–1)
MONOCYTES NFR BLD AUTO: 12.3 %
NEUTROPHILS # BLD AUTO: 3.41 X10 (3) UL (ref 1.5–7.7)
NEUTROPHILS # BLD AUTO: 3.41 X10(3) UL (ref 1.5–7.7)
NEUTROPHILS NFR BLD AUTO: 66.7 %
OSMOLALITY SERPL CALC.SUM OF ELEC: 294 MOSM/KG (ref 275–295)
OSMOLALITY UR: 395 MOSM/KG (ref 300–1100)
PLATELET # BLD AUTO: 258 10(3)UL (ref 150–450)
POTASSIUM SERPL-SCNC: 3.9 MMOL/L (ref 3.5–5.1)
RBC # BLD AUTO: 4.08 X10(6)UL
SODIUM SERPL-SCNC: 139 MMOL/L (ref 136–145)
SODIUM SERPL-SCNC: 86 MMOL/L
WBC # BLD AUTO: 5.1 X10(3) UL (ref 4–11)

## 2022-08-29 PROCEDURE — 4A10X4Z MONITORING OF CENTRAL NERVOUS ELECTRICAL ACTIVITY, EXTERNAL APPROACH: ICD-10-PCS | Performed by: OTHER

## 2022-08-29 PROCEDURE — 99226 SUBSEQUENT OBSERVATION CARE: CPT | Performed by: HOSPITALIST

## 2022-08-29 PROCEDURE — 95816 EEG AWAKE AND DROWSY: CPT | Performed by: OTHER

## 2022-08-29 RX ORDER — ATORVASTATIN CALCIUM 40 MG/1
40 TABLET, FILM COATED ORAL NIGHTLY
Status: DISCONTINUED | OUTPATIENT
Start: 2022-08-29 | End: 2022-08-30

## 2022-08-29 NOTE — CM/SW NOTE
BPCI Bundled Advanced Medicare Program    Plan of care reviewed for care coordination and discharge planning. Noted pt falls under  BPCI/Medicare program, with , W for Urinary Tract Infection     NSOC MICHELLE Proposal:  Post Acute Care pending progress    Plan: Return to [facility] for [facility] pending medical clearance. / to remain available for support and/or discharge planning.      LIZBETH Morton    [phone]

## 2022-08-29 NOTE — PLAN OF CARE
Patient alert to self, admitted from ED, high blood pressures, ED notified MD and MD aware. Patient calm but fidgety on upon admission to general floor. Keppra IV given, and Prn hydralazine given. IV fluids started. Admission completed and information relayed to night RN. Q4 hour neuro checks. Problem: Patient Centered Care  Goal: Patient preferences are identified and integrated in the patient's plan of care  Description: Interventions:  - What would you like us to know as we care for you?  From Shanika Campbell  - Provide timely, complete, and accurate information to patient/family  - Incorporate patient and family knowledge, values, beliefs, and cultural backgrounds into the planning and delivery of care  - Encourage patient/family to participate in care and decision-making at the level they choose  - Honor patient and family perspectives and choices  Outcome: Progressing     Problem: Patient/Family Goals  Goal: Patient/Family Long Term Goal  Description: Patient's Long Term Goal: JENNIFER    Interventions:  - JENNIFER  - See additional Care Plan goals for specific interventions  Outcome: Progressing  Goal: Patient/Family Short Term Goal  Description: Patient's Short Term Goal: JENNIFER    Interventions:   - JENNIFER  - See additional Care Plan goals for specific interventions  Outcome: Progressing

## 2022-08-29 NOTE — PLAN OF CARE
Monitoring vital signs & neuro status, no acute changes. Pt's level of alertness varies throughout the day. Pt follows commands when awake & alert, able to tell us the year & that shes in the hospital. Family at bedside conversing & re-orienting pt. Frequent rounding by nursing staff. Problem: Patient Centered Care  Goal: Patient preferences are identified and integrated in the patient's plan of care  Description: Interventions:  - What would you like us to know as we care for you?   - Provide timely, complete, and accurate information to patient/family  - Incorporate patient and family knowledge, values, beliefs, and cultural backgrounds into the planning and delivery of care  - Encourage patient/family to participate in care and decision-making at the level they choose  - Honor patient and family perspectives and choices  Outcome: Progressing     Problem: Patient/Family Goals  Goal: Patient/Family Long Term Goal  Description: Patient's Long Term Goal:     Interventions:  -   - See additional Care Plan goals for specific interventions  Outcome: Progressing  Goal: Patient/Family Short Term Goal  Description: Patient's Short Term Goal:     Interventions:   -   - See additional Care Plan goals for specific interventions  Outcome: Progressing     Problem: SAFETY ADULT - FALL  Goal: Free from fall injury  Description: INTERVENTIONS:  - Assess pt frequently for physical needs  - Identify cognitive and physical deficits and behaviors that affect risk of falls.   - [place] fall precautions as indicated by assessment.  - Educate pt/family on patient safety including physical limitations  - Instruct pt to call for assistance with activity based on assessment  - Modify environment to reduce risk of injury  - Provide assistive devices as appropriate  - Consider OT/PT consult to assist with strengthening/mobility  - Encourage toileting schedule  Outcome: Progressing     Problem: DISCHARGE PLANNING  Goal: Discharge to home or other facility with appropriate resources  Description: INTERVENTIONS:  - Identify barriers to discharge w/pt and caregiver  - Include patient/family/discharge partner in discharge planning  - Arrange for needed discharge resources and transportation as appropriate  - Identify discharge learning needs (meds, wound care, etc)  - Arrange for interpreters to assist at discharge as needed  - Consider post-discharge preferences of patient/family/discharge partner  - Complete POLST form as appropriate  - Assess patient's ability to be responsible for managing their own health  - Refer to Case Management Department for coordinating discharge planning if the patient needs post-hospital services based on physician/LIP order or complex needs related to functional status, cognitive ability or social support system  Outcome: Progressing     Problem: CARDIOVASCULAR - ADULT  Goal: Maintains optimal cardiac output and hemodynamic stability  Description: INTERVENTIONS:  - Monitor vital signs, rhythm, and trends  - Monitor for bleeding, hypotension and signs of decreased cardiac output  - Evaluate effectiveness of vasoactive medications to optimize hemodynamic stability  - Monitor arterial and/or venous puncture sites for bleeding and/or hematoma  - Assess quality of pulses, skin color and temperature  - Assess for signs of decreased coronary artery perfusion - ex.  Angina  - Evaluate fluid balance, assess for edema, trend weights  Outcome: Progressing     Problem: SKIN/TISSUE INTEGRITY - ADULT  Goal: Skin integrity remains intact  Description: INTERVENTIONS  - Assess and document risk factors for pressure ulcer development  - Assess and document skin integrity  - Monitor for areas of redness and/or skin breakdown  - Initiate interventions, skin care algorithm/standards of care as needed  Outcome: Progressing  Goal: Incision(s), wounds(s) or drain site(s) healing without S/S of infection  Description: INTERVENTIONS:  - Assess and document risk factors for pressure ulcer development  - Assess and document skin integrity  - Assess and document dressing/incision, wound bed, drain sites and surrounding tissue  - Implement wound care per orders  - Initiate isolation precautions as appropriate  - Initiate Pressure Ulcer prevention bundle as indicated  Outcome: Progressing     Problem: NEUROLOGICAL - ADULT  Goal: Achieves stable or improved neurological status  Description: INTERVENTIONS  - Assess for and report changes in neurological status  - Initiate measures to prevent increased intracranial pressure  - Maintain blood pressure and fluid volume within ordered parameters to optimize cerebral perfusion and minimize risk of hemorrhage  - Monitor temperature, glucose, and sodium.  Initiate appropriate interventions as ordered  Outcome: Progressing  Goal: Absence of seizures  Description: INTERVENTIONS  - Monitor for seizure activity  - Administer anti-seizure medications as ordered  - Monitor neurological status  Outcome: Progressing  Goal: Remains free of injury related to seizure activity  Description: INTERVENTIONS:  - Maintain airway, patient safety  and administer oxygen as ordered  - Monitor patient for seizure activity, document and report duration and description of seizure to MD/LIP  - If seizure occurs, turn patient to side and suction secretions as needed  - Reorient patient post seizure  - Seizure pads on all 4 side rails  - Instruct patient/family to notify RN of any seizure activity  - Instruct patient/family to call for assistance with activity based on assessment  Outcome: Progressing  Goal: Achieves maximal functionality and self care  Description: INTERVENTIONS  - Monitor swallowing and airway patency with patient fatigue and changes in neurological status  - Encourage and assist patient to increase activity and self care with guidance from PT/OT  - Encourage visually impaired, hearing impaired and aphasic patients to use assistive/communication devices  Outcome: Progressing     Problem: Impaired Cognition  Goal: Patient will exhibit improved attention, thought processing and/or memory  Description: Interventions:  - Allow additional time for processing after asking questions or providing instructions  Outcome: Progressing

## 2022-08-29 NOTE — CM/SW NOTE
08/29/22 1100   CM/LUZ MARINA Referral Data   Referral Source    Reason for Referral Discharge planning;Readmission   Informant Son   Readmission Assessment   Factors that patient feels contributed to this readmission Acute/Chronic Clinical Presentation   Pt's living situation prior to admission? Subacute rehab   Pt's level of independence at discharge? Total assist (max)   Pt. received education on diagnoses at time of discharge? Yes   Did you know who and how to call someone if you felt worse? Yes   Did any new symptoms or issues develop after you were discharged? Yes   ----Post D/C symptoms: Symptoms/issue related to previous hospitalization Yes   Did you understand your discharge instructions? Yes   Were medications taken as indicated on discharge instructions? Yes   Did you have a follow-up appointment scheduled at time of discharge? No   Was full assessment completed by LUZ MARINA/SUSSY on prior admission? Yes   Was the recommended discharge plan achieved? Yes   Was pt. discharged w/out services? No   Pertinent Medical Hx   Does patient have an established PCP? Yes  (Claudia Enrique)   Patient Info   Patient's Current Mental Status at Time of Assessment Confused or unable to complete assessment   Patient's [address]   Patient lives with Alone   Patient Status Prior to Admission   Independent with ADLs and Mobility No   Pt. requires assistance with Housework;Driving;Meals; Medications; Finances   Discharge Needs   Anticipated D/C needs Subacute rehab   Services Requested   PASRR Level 1 Submitted No - Current within 90 days   Choice of Post-Acute Provider   Informed patient of right to choose their preferred provider Yes   List of appropriate post-acute services provided to patient/family with quality data Yes   Patient/family choice   ([address])     Pt discussed during nursing rounds. Dx AMS. From [address] for [facility], home alone prior to that.  Pt's son agreeable to return to [facility] for [facility] at [employer]. Return referral sent in [place]. PT/OT evals pending. Plan: [facility] pending evals and medical clearance. / to remain available for support and/or discharge planning.      GARCÍA MortonN    [phone]

## 2022-08-30 VITALS
SYSTOLIC BLOOD PRESSURE: 167 MMHG | OXYGEN SATURATION: 95 % | TEMPERATURE: 98 F | WEIGHT: 140.69 LBS | BODY MASS INDEX: 23 KG/M2 | RESPIRATION RATE: 22 BRPM | DIASTOLIC BLOOD PRESSURE: 72 MMHG | HEART RATE: 64 BPM

## 2022-08-30 LAB
ANION GAP SERPL CALC-SCNC: 5 MMOL/L (ref 0–18)
BASOPHILS # BLD AUTO: 0.05 X10(3) UL (ref 0–0.2)
BASOPHILS NFR BLD AUTO: 1.4 %
BUN BLD-MCNC: 33 MG/DL (ref 7–18)
BUN/CREAT SERPL: 45.2 (ref 10–20)
CALCIUM BLD-MCNC: 8 MG/DL (ref 8.5–10.1)
CHLORIDE SERPL-SCNC: 111 MMOL/L (ref 98–112)
CO2 SERPL-SCNC: 24 MMOL/L (ref 21–32)
CREAT BLD-MCNC: 0.73 MG/DL
DEPRECATED RDW RBC AUTO: 46.1 FL (ref 35.1–46.3)
EOSINOPHIL # BLD AUTO: 0.14 X10(3) UL (ref 0–0.7)
EOSINOPHIL NFR BLD AUTO: 3.8 %
ERYTHROCYTE [DISTWIDTH] IN BLOOD BY AUTOMATED COUNT: 13.5 % (ref 11–15)
GFR SERPLBLD BASED ON 1.73 SQ M-ARVRAT: 76 ML/MIN/1.73M2 (ref 60–?)
GLUCOSE BLD-MCNC: 85 MG/DL (ref 70–99)
HCT VFR BLD AUTO: 34.1 %
HGB BLD-MCNC: 11 G/DL
IMM GRANULOCYTES # BLD AUTO: 0.02 X10(3) UL (ref 0–1)
IMM GRANULOCYTES NFR BLD: 0.5 %
LYMPHOCYTES # BLD AUTO: 0.81 X10(3) UL (ref 1–4)
LYMPHOCYTES NFR BLD AUTO: 22.3 %
MCH RBC QN AUTO: 30.6 PG (ref 26–34)
MCHC RBC AUTO-ENTMCNC: 32.3 G/DL (ref 31–37)
MCV RBC AUTO: 94.7 FL
MONOCYTES # BLD AUTO: 0.49 X10(3) UL (ref 0.1–1)
MONOCYTES NFR BLD AUTO: 13.5 %
NEUTROPHILS # BLD AUTO: 2.13 X10 (3) UL (ref 1.5–7.7)
NEUTROPHILS # BLD AUTO: 2.13 X10(3) UL (ref 1.5–7.7)
NEUTROPHILS NFR BLD AUTO: 58.5 %
OSMOLALITY SERPL CALC.SUM OF ELEC: 297 MOSM/KG (ref 275–295)
PLATELET # BLD AUTO: 230 10(3)UL (ref 150–450)
POTASSIUM SERPL-SCNC: 3.9 MMOL/L (ref 3.5–5.1)
RBC # BLD AUTO: 3.6 X10(6)UL
SARS-COV-2 RNA RESP QL NAA+PROBE: NOT DETECTED
SODIUM SERPL-SCNC: 140 MMOL/L (ref 136–145)
WBC # BLD AUTO: 3.6 X10(3) UL (ref 4–11)

## 2022-08-30 PROCEDURE — 99217 OBSERVATION CARE DISCHARGE: CPT | Performed by: HOSPITALIST

## 2022-08-30 RX ORDER — LEVETIRACETAM 500 MG/1
500 TABLET ORAL 2 TIMES DAILY
Refills: 0 | Status: SHIPPED | COMMUNITY
Start: 2022-08-30

## 2022-08-30 NOTE — PLAN OF CARE
Problem: Patient Centered Care  Goal: Patient preferences are identified and integrated in the patient's plan of care  Description: Interventions:  - What would you like us to know as we care for you? From [facility]  - Provide timely, complete, and accurate information to patient/family  - Incorporate patient and family knowledge, values, beliefs, and cultural backgrounds into the planning and delivery of care  - Encourage patient/family to participate in care and decision-making at the level they choose  - Honor patient and family perspectives and choices  Outcome: Progressing     Problem: Patient/Family Goals  Goal: Patient/Family Long Term Goal  Description: Patient's Long Term Goal: Discharge from the hospital    Interventions:  - Monitor vital signs  - Monitor appropriate labs  - Seizure and fall precautions  - Administer medications per order  - Follow MD orders  - Diagnostics per order  - Update / inform patient and family on plan of care  - Discharge planning  - See additional Care Plan goals for specific interventions  Outcome: Progressing  Goal: Patient/Family Short Term Goal  Description: Patient's Short Term Goal: Improve mental status    Interventions:   - Monitor vital signs  - Monitor appropriate labs  - Seizure and fall precautions  - Administer medications per order  - Follow MD orders  - Diagnostics per order  - Update / inform patient and family on plan of care  - See additional Care Plan goals for specific interventions  Outcome: Progressing     Problem: SAFETY ADULT - FALL  Goal: Free from fall injury  Description: INTERVENTIONS:  - Assess pt frequently for physical needs  - Identify cognitive and physical deficits and behaviors that affect risk of falls.   - [place] fall precautions as indicated by assessment.  - Educate pt/family on patient safety including physical limitations  - Instruct pt to call for assistance with activity based on assessment  - Modify environment to reduce risk of injury  - Provide assistive devices as appropriate  - Consider OT/PT consult to assist with strengthening/mobility  - Encourage toileting schedule  Outcome: Progressing     Problem: DISCHARGE PLANNING  Goal: Discharge to home or other facility with appropriate resources  Description: INTERVENTIONS:  - Identify barriers to discharge w/pt and caregiver  - Include patient/family/discharge partner in discharge planning  - Arrange for needed discharge resources and transportation as appropriate  - Identify discharge learning needs (meds, wound care, etc)  - Arrange for interpreters to assist at discharge as needed  - Consider post-discharge preferences of patient/family/discharge partner  - Complete POLST form as appropriate  - Assess patient's ability to be responsible for managing their own health  - Refer to Case Management Department for coordinating discharge planning if the patient needs post-hospital services based on physician/LIP order or complex needs related to functional status, cognitive ability or social support system  Outcome: Progressing     Problem: CARDIOVASCULAR - ADULT  Goal: Maintains optimal cardiac output and hemodynamic stability  Description: INTERVENTIONS:  - Monitor vital signs, rhythm, and trends  - Monitor for bleeding, hypotension and signs of decreased cardiac output  - Evaluate effectiveness of vasoactive medications to optimize hemodynamic stability  - Monitor arterial and/or venous puncture sites for bleeding and/or hematoma  - Assess quality of pulses, skin color and temperature  - Assess for signs of decreased coronary artery perfusion - ex.  Angina  - Evaluate fluid balance, assess for edema, trend weights  Outcome: Progressing     Problem: SKIN/TISSUE INTEGRITY - ADULT  Goal: Skin integrity remains intact  Description: INTERVENTIONS  - Assess and document risk factors for pressure ulcer development  - Assess and document skin integrity  - Monitor for areas of redness and/or skin breakdown  - Initiate interventions, skin care algorithm/standards of care as needed  Outcome: Progressing  Goal: Incision(s), wounds(s) or drain site(s) healing without S/S of infection  Description: INTERVENTIONS:  - Assess and document risk factors for pressure ulcer development  - Assess and document skin integrity  - Assess and document dressing/incision, wound bed, drain sites and surrounding tissue  - Implement wound care per orders  - Initiate isolation precautions as appropriate  - Initiate Pressure Ulcer prevention bundle as indicated  Outcome: Progressing     Problem: NEUROLOGICAL - ADULT  Goal: Achieves stable or improved neurological status  Description: INTERVENTIONS  - Assess for and report changes in neurological status  - Initiate measures to prevent increased intracranial pressure  - Maintain blood pressure and fluid volume within ordered parameters to optimize cerebral perfusion and minimize risk of hemorrhage  - Monitor temperature, glucose, and sodium.  Initiate appropriate interventions as ordered  Outcome: Progressing  Goal: Absence of seizures  Description: INTERVENTIONS  - Monitor for seizure activity  - Administer anti-seizure medications as ordered  - Monitor neurological status  Outcome: Progressing  Goal: Remains free of injury related to seizure activity  Description: INTERVENTIONS:  - Maintain airway, patient safety  and administer oxygen as ordered  - Monitor patient for seizure activity, document and report duration and description of seizure to MD/LIP  - If seizure occurs, turn patient to side and suction secretions as needed  - Reorient patient post seizure  - Seizure pads on all 4 side rails  - Instruct patient/family to notify RN of any seizure activity  - Instruct patient/family to call for assistance with activity based on assessment  Outcome: Progressing  Goal: Achieves maximal functionality and self care  Description: INTERVENTIONS  - Monitor swallowing and airway patency with patient fatigue and changes in neurological status  - Encourage and assist patient to increase activity and self care with guidance from PT/OT  - Encourage visually impaired, hearing impaired and aphasic patients to use assistive/communication devices  Outcome: Progressing     Problem: Impaired Cognition  Goal: Patient will exhibit improved attention, thought processing and/or memory  Description: Interventions:  - Minimize distractions in the room when full attention is required  - Allow additional time for processing after asking questions or providing instructions  - Encourage use of eye glasses  Outcome: Progressing     Problem: Impaired Swallowing  Goal: Minimize aspiration risk  Description: Interventions:  - Patient should be alert and upright for all feedings (90 degrees preferred)  - Offer food and liquids at a slow rate  - No straws  - Encourage small bites of food and small sips of liquid  - Offer pills one at a time, crush or deliver with applesauce as needed  - Discontinue feeding and notify MD (or speech pathologist) if coughing or persistent throat clearing or wet/gurgly vocal quality is noted  Outcome: Progressing      Monitoring vital signs- stable at this time. Neurochecks per order. No acute changes noted at this moment. Safety and fall precautions maintained- bed alarm on, bed locked in lowest position, call light within reach. Frequent rounding by nursing staff.

## 2022-08-30 NOTE — PROGRESS NOTES
This RN gave report to receiving RN, Ynes Nelson, at [facility].

## 2022-08-30 NOTE — PHYSICAL THERAPY NOTE
PT order received, chart reviewed. Spoke with Tabitha Robb who reports patient is going back to rehab at [address] need for PT eval or notes for auth.  No PT eval performed

## 2022-08-30 NOTE — CM/SW NOTE
08/30/22 1200   Discharge disposition   Expected discharge disposition [facility] Provider   ([address])   Dis[facility]     Pt discussed during nursing rounds. Pt is stable for dc today. MD dc order entered. Pt will return to [address] for JACK, gilma Ordoñez confirmed bed is available today. Pt and son agreeable to transfer today. [facility] scheduled for 6pm . 1235: Pt is 2 max assist and unable to sit safely in [facility] per RN. Transport switched to ambulance transport. Number for nurse report is [phone]. Plan: DELMI Wise for JACK today. / to remain available for support and/or discharge planning.      LIZBETH Olvera    [phone]

## 2022-09-01 ENCOUNTER — EXTERNAL FACILITY (OUTPATIENT)
Dept: INTERNAL MEDICINE CLINIC | Facility: CLINIC | Age: 87
End: 2022-09-01

## 2022-09-01 DIAGNOSIS — R53.1 WEAKNESS GENERALIZED: ICD-10-CM

## 2022-09-01 DIAGNOSIS — R41.82 ALTERED MENTAL STATUS, UNSPECIFIED ALTERED MENTAL STATUS TYPE: ICD-10-CM

## 2022-09-01 DIAGNOSIS — I10 ESSENTIAL HYPERTENSION, BENIGN: ICD-10-CM

## 2022-09-01 PROCEDURE — 99306 1ST NF CARE HIGH MDM 50: CPT | Performed by: INTERNAL MEDICINE

## 2022-09-02 ENCOUNTER — INITIAL APN SNF VISIT (OUTPATIENT)
Dept: INTERNAL MEDICINE CLINIC | Facility: SKILLED NURSING FACILITY | Age: 87
End: 2022-09-02

## 2022-09-02 DIAGNOSIS — E78.5 HYPERLIPIDEMIA, UNSPECIFIED HYPERLIPIDEMIA TYPE: ICD-10-CM

## 2022-09-02 DIAGNOSIS — R56.9 SEIZURE (HCC): ICD-10-CM

## 2022-09-02 DIAGNOSIS — R41.82 ALTERED MENTAL STATUS, UNSPECIFIED ALTERED MENTAL STATUS TYPE: ICD-10-CM

## 2022-09-02 DIAGNOSIS — I10 ESSENTIAL HYPERTENSION, BENIGN: ICD-10-CM

## 2022-09-02 DIAGNOSIS — K21.9 GASTROESOPHAGEAL REFLUX DISEASE WITHOUT ESOPHAGITIS: ICD-10-CM

## 2022-09-06 ENCOUNTER — SNF VISIT (OUTPATIENT)
Dept: INTERNAL MEDICINE CLINIC | Facility: SKILLED NURSING FACILITY | Age: 87
End: 2022-09-06

## 2022-09-06 VITALS
DIASTOLIC BLOOD PRESSURE: 84 MMHG | RESPIRATION RATE: 20 BRPM | HEART RATE: 65 BPM | TEMPERATURE: 98 F | WEIGHT: 143.31 LBS | SYSTOLIC BLOOD PRESSURE: 160 MMHG | OXYGEN SATURATION: 97 % | BODY MASS INDEX: 24 KG/M2

## 2022-09-06 DIAGNOSIS — R53.1 WEAKNESS GENERALIZED: ICD-10-CM

## 2022-09-06 DIAGNOSIS — I10 ESSENTIAL HYPERTENSION, BENIGN: ICD-10-CM

## 2022-09-06 DIAGNOSIS — N18.31 STAGE 3A CHRONIC KIDNEY DISEASE (HCC): ICD-10-CM

## 2022-09-06 DIAGNOSIS — R41.82 ALTERED MENTAL STATUS, UNSPECIFIED ALTERED MENTAL STATUS TYPE: ICD-10-CM

## 2022-09-06 PROCEDURE — 99309 SBSQ NF CARE MODERATE MDM 30: CPT | Performed by: NURSE PRACTITIONER

## 2022-09-06 PROCEDURE — 1126F AMNT PAIN NOTED NONE PRSNT: CPT | Performed by: NURSE PRACTITIONER

## 2022-09-13 ENCOUNTER — TELEPHONE (OUTPATIENT)
Dept: INTERNAL MEDICINE CLINIC | Facility: CLINIC | Age: 87
End: 2022-09-13

## 2022-09-13 ENCOUNTER — EXTERNAL FACILITY (OUTPATIENT)
Dept: INTERNAL MEDICINE CLINIC | Facility: CLINIC | Age: 87
End: 2022-09-13

## 2022-09-13 DIAGNOSIS — R53.1 WEAKNESS GENERALIZED: ICD-10-CM

## 2022-09-13 DIAGNOSIS — N18.30 STAGE 3 CHRONIC KIDNEY DISEASE, UNSPECIFIED WHETHER STAGE 3A OR 3B CKD (HCC): ICD-10-CM

## 2022-09-13 DIAGNOSIS — I10 ESSENTIAL HYPERTENSION, BENIGN: ICD-10-CM

## 2022-09-13 PROCEDURE — 99309 SBSQ NF CARE MODERATE MDM 30: CPT | Performed by: INTERNAL MEDICINE

## 2022-09-13 NOTE — TELEPHONE ENCOUNTER
Patients sons came to [facility] office requesting to speak to Dr Merari Roberson in regards to Patients health- They have questions in regards to power of           Please call  Mya Deras at  [phone]

## 2022-09-15 ENCOUNTER — EXTERNAL FACILITY (OUTPATIENT)
Dept: INTERNAL MEDICINE CLINIC | Facility: CLINIC | Age: 87
End: 2022-09-15

## 2022-09-15 DIAGNOSIS — N18.30 STAGE 3 CHRONIC KIDNEY DISEASE, UNSPECIFIED WHETHER STAGE 3A OR 3B CKD (HCC): ICD-10-CM

## 2022-09-15 DIAGNOSIS — R41.82 ALTERED MENTAL STATUS, UNSPECIFIED ALTERED MENTAL STATUS TYPE: ICD-10-CM

## 2022-09-15 DIAGNOSIS — I10 ESSENTIAL HYPERTENSION, BENIGN: ICD-10-CM

## 2022-09-15 PROCEDURE — 99308 SBSQ NF CARE LOW MDM 20: CPT | Performed by: INTERNAL MEDICINE

## 2022-09-15 NOTE — TELEPHONE ENCOUNTER
Per Dilan Mention son of patient he insist to talk to Dr Kemal Parker in regards to his mother medical condition. Please advise. Gail Arteaga

## 2022-09-15 NOTE — TELEPHONE ENCOUNTER
I spoke with pt son  Discussed  need to see psychiatrist  for eval if pt is able to make her own decion re power of attorner or guardianshiop  Arthur talk to rehab doctor or  re recommendation

## 2022-09-19 ENCOUNTER — SNF VISIT (OUTPATIENT)
Dept: INTERNAL MEDICINE CLINIC | Facility: SKILLED NURSING FACILITY | Age: 87
End: 2022-09-19

## 2022-09-19 VITALS
TEMPERATURE: 98 F | DIASTOLIC BLOOD PRESSURE: 60 MMHG | WEIGHT: 143.31 LBS | HEART RATE: 62 BPM | BODY MASS INDEX: 24 KG/M2 | RESPIRATION RATE: 20 BRPM | SYSTOLIC BLOOD PRESSURE: 120 MMHG | OXYGEN SATURATION: 98 %

## 2022-09-19 DIAGNOSIS — I10 ESSENTIAL HYPERTENSION, BENIGN: ICD-10-CM

## 2022-09-19 DIAGNOSIS — R53.1 WEAKNESS GENERALIZED: ICD-10-CM

## 2022-09-19 DIAGNOSIS — N18.30 STAGE 3 CHRONIC KIDNEY DISEASE, UNSPECIFIED WHETHER STAGE 3A OR 3B CKD (HCC): ICD-10-CM

## 2022-09-19 DIAGNOSIS — R41.82 ALTERED MENTAL STATUS, UNSPECIFIED ALTERED MENTAL STATUS TYPE: ICD-10-CM

## 2022-09-20 ENCOUNTER — EXTERNAL FACILITY (OUTPATIENT)
Dept: INTERNAL MEDICINE CLINIC | Facility: CLINIC | Age: 87
End: 2022-09-20

## 2022-09-20 DIAGNOSIS — R53.1 WEAKNESS GENERALIZED: ICD-10-CM

## 2022-09-20 DIAGNOSIS — N18.30 STAGE 3 CHRONIC KIDNEY DISEASE, UNSPECIFIED WHETHER STAGE 3A OR 3B CKD (HCC): ICD-10-CM

## 2022-09-20 DIAGNOSIS — I10 ESSENTIAL HYPERTENSION, BENIGN: ICD-10-CM

## 2022-09-20 PROCEDURE — 99308 SBSQ NF CARE LOW MDM 20: CPT | Performed by: INTERNAL MEDICINE

## 2022-09-21 ENCOUNTER — SNF VISIT (OUTPATIENT)
Dept: INTERNAL MEDICINE CLINIC | Facility: SKILLED NURSING FACILITY | Age: 87
End: 2022-09-21

## 2022-09-21 VITALS
RESPIRATION RATE: 20 BRPM | SYSTOLIC BLOOD PRESSURE: 130 MMHG | WEIGHT: 143.31 LBS | BODY MASS INDEX: 24 KG/M2 | OXYGEN SATURATION: 93 % | HEART RATE: 81 BPM | DIASTOLIC BLOOD PRESSURE: 61 MMHG | TEMPERATURE: 98 F

## 2022-09-21 DIAGNOSIS — R41.82 ALTERED MENTAL STATUS, UNSPECIFIED ALTERED MENTAL STATUS TYPE: ICD-10-CM

## 2022-09-21 DIAGNOSIS — I10 ESSENTIAL HYPERTENSION, BENIGN: ICD-10-CM

## 2022-09-21 DIAGNOSIS — N18.30 STAGE 3 CHRONIC KIDNEY DISEASE, UNSPECIFIED WHETHER STAGE 3A OR 3B CKD (HCC): ICD-10-CM

## 2022-09-21 DIAGNOSIS — R53.1 WEAKNESS GENERALIZED: ICD-10-CM

## 2022-09-21 DIAGNOSIS — R56.9 SEIZURE (HCC): ICD-10-CM

## 2022-09-21 PROCEDURE — 1126F AMNT PAIN NOTED NONE PRSNT: CPT | Performed by: NURSE PRACTITIONER

## 2022-09-21 PROCEDURE — 99309 SBSQ NF CARE MODERATE MDM 30: CPT | Performed by: NURSE PRACTITIONER

## 2022-09-22 ENCOUNTER — EXTERNAL FACILITY (OUTPATIENT)
Dept: INTERNAL MEDICINE CLINIC | Facility: CLINIC | Age: 87
End: 2022-09-22

## 2022-09-22 DIAGNOSIS — I10 ESSENTIAL HYPERTENSION, BENIGN: ICD-10-CM

## 2022-09-22 DIAGNOSIS — E78.00 PURE HYPERCHOLESTEROLEMIA: ICD-10-CM

## 2022-09-22 DIAGNOSIS — R53.1 WEAKNESS GENERALIZED: ICD-10-CM

## 2022-09-22 PROCEDURE — 99308 SBSQ NF CARE LOW MDM 20: CPT | Performed by: INTERNAL MEDICINE

## 2022-09-23 ENCOUNTER — TELEPHONE (OUTPATIENT)
Dept: INTERNAL MEDICINE CLINIC | Facility: CLINIC | Age: 87
End: 2022-09-23

## 2022-09-23 NOTE — TELEPHONE ENCOUNTER
Patients son Mckenna Pérez dropped off assistant living forms to be filled out by MD.  Mckenna Elisa states that the patient, Lily Acosta also needs a TB shot if the provider can place an order for -that or if patient needs appt. Call Mckenna Pérez at [phone], if he doesn't answer to please leave a detailed message.

## 2022-09-26 ENCOUNTER — SNF VISIT (OUTPATIENT)
Dept: INTERNAL MEDICINE CLINIC | Facility: SKILLED NURSING FACILITY | Age: 87
End: 2022-09-26

## 2022-09-26 VITALS
WEIGHT: 143.88 LBS | OXYGEN SATURATION: 98 % | SYSTOLIC BLOOD PRESSURE: 120 MMHG | RESPIRATION RATE: 20 BRPM | BODY MASS INDEX: 24 KG/M2 | HEART RATE: 66 BPM | TEMPERATURE: 98 F | DIASTOLIC BLOOD PRESSURE: 67 MMHG

## 2022-09-26 DIAGNOSIS — N18.30 STAGE 3 CHRONIC KIDNEY DISEASE, UNSPECIFIED WHETHER STAGE 3A OR 3B CKD (HCC): ICD-10-CM

## 2022-09-26 DIAGNOSIS — R41.82 ALTERED MENTAL STATUS, UNSPECIFIED ALTERED MENTAL STATUS TYPE: ICD-10-CM

## 2022-09-26 DIAGNOSIS — R53.1 WEAKNESS GENERALIZED: ICD-10-CM

## 2022-09-26 DIAGNOSIS — Z02.9 DISCHARGE PLANNING ISSUES: ICD-10-CM

## 2022-09-26 DIAGNOSIS — I10 ESSENTIAL HYPERTENSION, BENIGN: ICD-10-CM

## 2022-09-26 PROCEDURE — 99310 SBSQ NF CARE HIGH MDM 45: CPT | Performed by: NURSE PRACTITIONER

## 2022-09-26 PROCEDURE — 1126F AMNT PAIN NOTED NONE PRSNT: CPT | Performed by: NURSE PRACTITIONER

## 2022-09-26 NOTE — TELEPHONE ENCOUNTER
Per Dr Isa Cruz Pt needs to come in for OV to complete assisted living forms . Call center please contact Pt and schedule an OV for forms to be completed .

## 2022-09-27 ENCOUNTER — EXTERNAL FACILITY (OUTPATIENT)
Dept: INTERNAL MEDICINE CLINIC | Facility: CLINIC | Age: 87
End: 2022-09-27

## 2022-09-27 DIAGNOSIS — R56.9 SEIZURE (HCC): ICD-10-CM

## 2022-09-27 DIAGNOSIS — N18.30 STAGE 3 CHRONIC KIDNEY DISEASE, UNSPECIFIED WHETHER STAGE 3A OR 3B CKD (HCC): ICD-10-CM

## 2022-09-27 DIAGNOSIS — I10 ESSENTIAL HYPERTENSION, BENIGN: ICD-10-CM

## 2022-09-27 DIAGNOSIS — E78.00 PURE HYPERCHOLESTEROLEMIA: ICD-10-CM

## 2022-09-27 PROCEDURE — 99308 SBSQ NF CARE LOW MDM 20: CPT | Performed by: INTERNAL MEDICINE

## 2022-09-27 NOTE — TELEPHONE ENCOUNTER
1st call - Talked to Jeremie daughter in law and will relay message to Geovani Malloy  about message below.

## 2022-09-28 ENCOUNTER — SNF DISCHARGE (OUTPATIENT)
Dept: INTERNAL MEDICINE CLINIC | Facility: SKILLED NURSING FACILITY | Age: 87
End: 2022-09-28

## 2022-09-28 VITALS
TEMPERATURE: 97 F | SYSTOLIC BLOOD PRESSURE: 127 MMHG | HEART RATE: 74 BPM | WEIGHT: 143.31 LBS | DIASTOLIC BLOOD PRESSURE: 76 MMHG | RESPIRATION RATE: 20 BRPM | OXYGEN SATURATION: 97 % | BODY MASS INDEX: 24 KG/M2

## 2022-09-28 DIAGNOSIS — R53.1 WEAKNESS GENERALIZED: ICD-10-CM

## 2022-09-28 DIAGNOSIS — I10 ESSENTIAL HYPERTENSION, BENIGN: ICD-10-CM

## 2022-09-28 DIAGNOSIS — Z02.9 PROBLEM RELATED TO DISCHARGE PLANNING: ICD-10-CM

## 2022-09-28 DIAGNOSIS — R41.82 ALTERED MENTAL STATUS, UNSPECIFIED ALTERED MENTAL STATUS TYPE: ICD-10-CM

## 2022-09-28 DIAGNOSIS — N18.30 STAGE 3 CHRONIC KIDNEY DISEASE, UNSPECIFIED WHETHER STAGE 3A OR 3B CKD (HCC): ICD-10-CM

## 2022-09-28 DIAGNOSIS — E78.00 PURE HYPERCHOLESTEROLEMIA: ICD-10-CM

## 2022-09-28 PROCEDURE — 99310 SBSQ NF CARE HIGH MDM 45: CPT | Performed by: NURSE PRACTITIONER

## 2022-09-28 PROCEDURE — 1126F AMNT PAIN NOTED NONE PRSNT: CPT | Performed by: NURSE PRACTITIONER

## 2022-09-29 ENCOUNTER — SNF VISIT (OUTPATIENT)
Dept: INTERNAL MEDICINE CLINIC | Facility: SKILLED NURSING FACILITY | Age: 87
End: 2022-09-29

## 2022-09-29 ENCOUNTER — EXTERNAL FACILITY (OUTPATIENT)
Dept: INTERNAL MEDICINE CLINIC | Facility: CLINIC | Age: 87
End: 2022-09-29

## 2022-09-29 DIAGNOSIS — R41.0 CONFUSION: ICD-10-CM

## 2022-09-29 DIAGNOSIS — R41.82 ALTERED MENTAL STATUS, UNSPECIFIED ALTERED MENTAL STATUS TYPE: ICD-10-CM

## 2022-09-29 DIAGNOSIS — R56.9 SEIZURE (HCC): ICD-10-CM

## 2022-09-29 DIAGNOSIS — J34.89 SINUS PRESSURE: ICD-10-CM

## 2022-09-29 DIAGNOSIS — I10 ESSENTIAL HYPERTENSION, BENIGN: ICD-10-CM

## 2022-09-29 DIAGNOSIS — N18.30 STAGE 3 CHRONIC KIDNEY DISEASE, UNSPECIFIED WHETHER STAGE 3A OR 3B CKD (HCC): ICD-10-CM

## 2022-09-29 DIAGNOSIS — R53.1 WEAKNESS GENERALIZED: ICD-10-CM

## 2022-09-29 DIAGNOSIS — E78.00 PURE HYPERCHOLESTEROLEMIA: ICD-10-CM

## 2022-09-29 PROCEDURE — 99309 SBSQ NF CARE MODERATE MDM 30: CPT | Performed by: INTERNAL MEDICINE

## 2022-09-29 PROCEDURE — 99310 SBSQ NF CARE HIGH MDM 45: CPT | Performed by: NURSE PRACTITIONER

## 2022-09-29 NOTE — TELEPHONE ENCOUNTER
Patient son called back very upset stating that he will no longer have patient be seen with provider. States provider and staff have not be responsive to their needs and they will not be taking any more phone calls. Patient son states to please stop calling them. Patient declined a call back.

## 2022-09-29 NOTE — TELEPHONE ENCOUNTER
FYI: 2nd call - LMTCB to patient home# and talked to son Stefanie Moment he says he will call back due to patient is in rehab.

## 2022-09-30 NOTE — TELEPHONE ENCOUNTER
I respect son's wishes   DO  NOT CALL son  Physical exam for entrance to facility  Nursing home was given to me  I have not seen patient since June  and I advised to her to come in to appropriately do an exam and answer all the questions   Regarding her mental status and and functional status

## 2022-10-04 ENCOUNTER — OFFICE VISIT (OUTPATIENT)
Dept: FAMILY MEDICINE CLINIC | Facility: CLINIC | Age: 87
End: 2022-10-04
Payer: COMMERCIAL

## 2022-10-04 ENCOUNTER — TELEPHONE (OUTPATIENT)
Dept: INTERNAL MEDICINE CLINIC | Facility: CLINIC | Age: 87
End: 2022-10-04

## 2022-10-04 DIAGNOSIS — Z11.1 VISIT FOR TB SKIN TEST: Primary | ICD-10-CM

## 2022-10-04 DIAGNOSIS — R26.9 ABNORMALITY OF GAIT AND MOBILITY: Primary | ICD-10-CM

## 2022-10-04 PROCEDURE — 86580 TB INTRADERMAL TEST: CPT | Performed by: NURSE PRACTITIONER

## 2022-10-04 NOTE — TELEPHONE ENCOUNTER
Patient's son Kristine Avery  came into [place] office asking status of wheelchair for patient. He states that at last office visit the patient had with provider he advised clinical staff she would need a wheelchair and states he was assured it would be taken care of. He states that patient had a recent fall and is needing the wheelchair now that she is at the assisted living facility. Patient's son is asking for a call back from the nurse or provider only. He does not want to speak to any other person. He is very upset and is asking for a call today or tomorrow at the latest.    Please advise.

## 2022-10-04 NOTE — TELEPHONE ENCOUNTER
Dr Steve Beck please advise on message below . DME order for wheelchair was placed 22 states  . New order pended , after order gets placed Pt has to find a DME provider or contact insurance for coverage .

## 2022-10-04 NOTE — PATIENT INSTRUCTIONS
You will need to return to clinic in 48-72 hours to have results of TB test read. Please return to clinic on 10/6/2022 after 3 pm or on 10/7/2022 before 3 pm to have your TB test read. If you do not return during this time your test will need to be repeated.      Our clinic hours are:  Monday-Friday        8:00 am to 7:30 pm  Saturday/Sunday    9:00 am to 4:30 pm

## 2022-10-05 NOTE — TELEPHONE ENCOUNTER
Spoke to son Dilan Perry , [place] name and  verified . Informed shabnam Perry order for wheelchair was placed and a copy was placed at the  AD[facility] for  . Dilan Perry had a lot of questions on how to proceed with order to obtain wheelchair . Explained to Dilan Perry he would have to contact primary insurance and they can inform which DME or pharmacy he can obtain the wheelchair covered by insurance  . Also explained if he knows of any DME store they can also verify the coverage and supply them with the wheelchair if they have in stock. Pt's son Dilan Perry verbalized understanding denied questions .

## 2022-10-06 ENCOUNTER — OFFICE VISIT (OUTPATIENT)
Dept: FAMILY MEDICINE CLINIC | Facility: CLINIC | Age: 87
End: 2022-10-06
Payer: COMMERCIAL

## 2022-10-06 DIAGNOSIS — Z11.1 ENCOUNTER FOR PPD SKIN TEST READING: Primary | ICD-10-CM

## 2022-10-06 NOTE — PROGRESS NOTES
Here for TB reading: negative    Results for orders placed or performed in visit on 10/04/22   TB INTRADERMAL TEST    Collection Time: 10/06/22  3:28 PM   Result Value Ref Range    Date Given: 10/04/2022     Site: right forearm     INDURATION (PPD) 0.0 mm 0.0 - 11 mm

## 2022-10-14 ENCOUNTER — TELEPHONE (OUTPATIENT)
Dept: INTERNAL MEDICINE CLINIC | Facility: CLINIC | Age: 87
End: 2022-10-14

## 2022-10-14 NOTE — TELEPHONE ENCOUNTER
Let Dr know we have not been able to reach patient this week. We did talk to her this past Monday. Patient stated she was having physical therapy through [facility], a facility where she is living, according to patient. We want to notify Dr Migel Fregoso she will be a non-admit.      Newark Diaz  [phone]

## 2022-12-01 ENCOUNTER — HOSPITAL ENCOUNTER (EMERGENCY)
Age: 87
Discharge: NURSING, REHAB OR LONG-TERM CARE | End: 2022-12-02
Attending: EMERGENCY MEDICINE

## 2022-12-01 ENCOUNTER — APPOINTMENT (OUTPATIENT)
Dept: CT IMAGING | Age: 87
End: 2022-12-01
Attending: EMERGENCY MEDICINE

## 2022-12-01 DIAGNOSIS — S01.91XA LACERATION OF HEAD WITHOUT FOREIGN BODY, UNSPECIFIED PART OF HEAD, INITIAL ENCOUNTER: ICD-10-CM

## 2022-12-01 DIAGNOSIS — S09.90XA INJURY OF HEAD, INITIAL ENCOUNTER: Primary | ICD-10-CM

## 2022-12-01 PROCEDURE — G1004 CDSM NDSC: HCPCS

## 2022-12-01 PROCEDURE — 12011 RPR F/E/E/N/L/M 2.5 CM/<: CPT

## 2022-12-01 PROCEDURE — 99284 EMERGENCY DEPT VISIT MOD MDM: CPT

## 2022-12-01 PROCEDURE — 70450 CT HEAD/BRAIN W/O DYE: CPT

## 2022-12-02 VITALS
WEIGHT: 132.5 LBS | RESPIRATION RATE: 16 BRPM | DIASTOLIC BLOOD PRESSURE: 69 MMHG | HEART RATE: 61 BPM | OXYGEN SATURATION: 97 % | TEMPERATURE: 98 F | SYSTOLIC BLOOD PRESSURE: 154 MMHG

## 2022-12-02 PROCEDURE — 12011 RPR F/E/E/N/L/M 2.5 CM/<: CPT | Performed by: EMERGENCY MEDICINE

## 2022-12-29 ENCOUNTER — HOSPITAL ENCOUNTER (EMERGENCY)
Facility: HOSPITAL | Age: 87
Discharge: NURSING, REHAB OR LONG-TERM CARE | End: 2022-12-29
Attending: EMERGENCY MEDICINE
Payer: MEDICARE

## 2022-12-29 VITALS
TEMPERATURE: 99 F | HEART RATE: 71 BPM | BODY MASS INDEX: 23.88 KG/M2 | OXYGEN SATURATION: 99 % | HEIGHT: 65 IN | RESPIRATION RATE: 20 BRPM | SYSTOLIC BLOOD PRESSURE: 124 MMHG | DIASTOLIC BLOOD PRESSURE: 61 MMHG | WEIGHT: 143.31 LBS

## 2022-12-29 DIAGNOSIS — S81.812A LACERATION OF LEFT LOWER EXTREMITY, INITIAL ENCOUNTER: Primary | ICD-10-CM

## 2022-12-29 PROCEDURE — 12004 RPR S/N/AX/GEN/TRK7.6-12.5CM: CPT

## 2022-12-29 PROCEDURE — 90471 IMMUNIZATION ADMIN: CPT

## 2022-12-29 PROCEDURE — 99283 EMERGENCY DEPT VISIT LOW MDM: CPT

## 2022-12-29 RX ORDER — CEPHALEXIN 500 MG/1
500 CAPSULE ORAL 2 TIMES DAILY
Qty: 10 CAPSULE | Refills: 0 | Status: SHIPPED | OUTPATIENT
Start: 2022-12-29 | End: 2023-01-03

## 2022-12-29 NOTE — ED INITIAL ASSESSMENT (HPI)
Pt to ED via EMS from assisted living c/o laceration to LLE last night while being transferred to wheelchair. Large, jagged laceration noted to anterior LLE. Unknown last tetanus. Bleeding controlled at this time.

## 2023-06-12 NOTE — TELEPHONE ENCOUNTER
Patients son DICKSON called to speak with Dr. Teresa Sinclair and requesting a call back today or Wednesday earlier morning prior to patients appointment in regards to Power of [employer] for this patient He is off today all day he will be at work tomorrow Tuesday and would miss the call. DICKSON awaiting call back from Dr. Teresa Sinclair to discuss further. Retention Suture Bite Size: 3 mm

## 2025-06-09 ENCOUNTER — TELEPHONE (OUTPATIENT)
Dept: INTERNAL MEDICINE CLINIC | Facility: CLINIC | Age: OVER 89
End: 2025-06-09

## 2025-06-09 NOTE — TELEPHONE ENCOUNTER
PCP removal order placed . Pt's last OV was 06/08/22 . New Pt to Dr Santoyo and is no longer accepting new Pt's .

## 2025-06-13 ENCOUNTER — PATIENT OUTREACH (OUTPATIENT)
Dept: CASE MANAGEMENT | Age: OVER 89
End: 2025-06-13

## 2025-06-13 NOTE — PROCEDURES
The office order for PCP removal request is Approved and finalized on June 13, 2025.    Removed Raúl Santoyo MD as the patient's Primary Care Physician

## (undated) NOTE — IP AVS SNAPSHOT
[facility]            (For Outpatient Use Only) Initial Admit Date: 2022   Inpt/Obs Admit Date: Inpt: 22 / Obs: N/A   Discharge Date:    Brigida Hooper:  [de-identified]   MRN: [de-identified]   CSN: [id number]   CEID: [id number]        ENCOUNTER  Patient Class: Inpatient Admitting Provider: Mariaelena Watts MD Unit: [address]   Hospital Service: Medical Attending Provider: Hellen Escobedo DO   Bed: 573-A   Visit Type:   Referring Physician: No ref. provider found Billing Flag:    Admit Diagnosis: Weakness generalized [R53.1]      PATIENT  Legal Name:   Parker Velazquez   Legal Sex: Female  Gender ID:              Pref Name:    PCP:  Humza Dixon MD Home: [phone]   Address:  [address] [place] [address] : 3/29/1927 (89 yrs) Mobile: [phone]         City/State/Zip: [facility] [address] Marital:  Language: Titus Balzarine: [place] SSN4: [id number] Caodaism: [address] Not Keysha Dies*     Race: White Ethnicity: Non  Or [address]   Name Relationship Legal Guardian? Home Phone Work Phone Mobile Phone   1. DIEGO HOLLOWAY  2. Morenita Guzman Son  Son    [phone]       GUARANTOR  Guarantor:  Ira Willis : 3/29/1927 Home Phone: [phone]   Address: [address] [place] [address]  Sex: Female Work Phone:    City/State/Zip: [address] [place] [address] Drive, [address] [place] [address] Rd   Rel. to Patient: Self Guarantor ID: [id number]   GUARANTOR EMPLOYER   Employer:  Status: RETIRED     COVERAGE  PRIMARY INSURANCE   Payor: MEDICARE Plan: MEDICARE PART A&B   Group Number:  Insurance Type: INDEMNITY   Subscriber Name: Jojo Moreno : 1927   Subscriber ID: [id number] Pt Rel to Subscriber: Self   SECONDARY INSURANCE   Payor: [address] [place] [address] Plan: [address] [place] [address]   Group Number: [id number] Insurance Type: [address] Name: Jojo Moreno : 3/29/1927   Subscriber ID: [id number] Pt Rel to Subscriber: SELF   TERTIARY INSURANCE   Payor:  Plan: Group Number:  Insurance Type:    Subscriber Name:  Subscriber :    Subscriber ID:  Pt Rel to Subscriber:    Hospital Account Financial Class: Medicare    2022

## (undated) NOTE — IP AVS SNAPSHOT
[facility]            (For Outpatient Use Only) Initial Admit Date: 2022   Inpt/Obs Admit Date: Inpt: N/A / Obs: 22   Discharge Date:    Hospital Acct:  [de-identified]   MRN: [de-identified]   CSN: [id number]   CEID: [id number]        ENCOUNTER  Patient Class: Observation Admitting Provider: Keenan Markham DO Unit: [facility] Service: Medical Attending Provider: Keenan Markham DO   Bed: 518-A   Visit Type:   Referring Physician: No ref. provider found Billing Flag:    Admit Diagnosis: Altered mental status, unspecified altered mental status type [R41.82]      PATIENT  Legal Name:   Carol Coppola   Legal Sex: Female  Gender ID:              [address] [facility] Name:    PCP:  Mary Pantoja MD Home: [phone]   Address:  [address] [place] [address] : 3/29/1927 (89 yrs) Mobile: [phone]         City/State/Zip: [facility] [address] Marital:  Language: Jaquelin [place]: Sean SSN4: [id number] Congregational: [address] Not Norma Lemuel*     Race: White Ethnicity: Non  Or [address] [place] [address]   Name Relationship Legal Guardian? Home Phone Work Phone Mobile Phone   1. Velasquez Salazar  2. [facility] Relative  Son No     [phone]    [phone]     GUARANTOR  Guarantor:  Conrad Tejeda : 3/29/1927 Home Phone: [phone]   Address: [address] [place] [address]  Sex: Female Work Phone:    City/State/Zip: [address], [address]   Rel. to Patient: Self Guarantor ID: [id number]   GUARANTOR EMPLOYER   Employer:  Status: RETIRED     COVERAGE  PRIMARY INSURANCE   Payor: MEDICARE Plan: MEDICARE PART A&B   Group Number:  Insurance Type: INDEMNITY   Subscriber Name: Jonathan Harrison : 1927   Subscriber ID: [id number] Pt Rel to Subscriber: Self   SECONDARY INSURANCE   Payor: [address] [place] [address] Plan: [address] [place] [address]   Group Number: [id number] Insurance Type: [id number] Name: Jonathan Harrison : 3/29/1927   Subscriber ID: [id number] Pt Rel to Subscriber: SELF   TERTIARY INSURANCE   Payor:  Plan:    Group Number:  Insurance Type:    Subscriber Name:  Subscriber :    Subscriber ID:  Pt Rel to Subscriber:    Hospital Account Financial Class: Medicare    2022

## (undated) NOTE — MR AVS SNAPSHOT
[address]  [address], [address]  [phone]               Thank you for choosing us for your health care visit with BENITO Avery MD.  We are glad to serve you and happy to provide you with this summary of Commonly known as:  APRESOLINE           losartan 100 MG Tabs   TAKE 1 TABLET DAILY   Commonly known as:  COZAAR           PREVNAR 13 Susp   Generic drug:  pneumococcal 13-Mihaela Conj Vacc                   MyChart     Call the susank for assistance with yo ? Take care when walking on gravel or grassy surfaces. ? Avoid walking on snowy or icy surfaces. ? Use a cane or walker (indoors and out) if you are unsteady on your feet.         Healthy Diet and Regular Exercise  The [address] fo

## (undated) NOTE — LETTER
October 6, 2022    [address] [facility]      Dear Chela Muñoz:    The following are the results of your recent tests. Please review the list of test results. Your result is the value on the left; we have also supplied the range of normal (low and high) values.     Results for orders placed or performed in visit on 10/04/22   TB INTRADERMAL TEST   Result Value Ref Range    Date Given: 10/04/2022     Site: right forearm     INDURATION (PPD) 0.0 mm 0.0 - 11 mm   Negative test read on 10/06/2022    Please call if you have further questions,            Edmond Barney

## (undated) NOTE — LETTER
October 24, 2017    [address] 68      Dear [facility]:  It was a pleasure speaking with you over the phone recently regarding our Chronic Care Management program. To follow up, I wanted to send you some contact inform